# Patient Record
Sex: FEMALE | Race: WHITE
[De-identification: names, ages, dates, MRNs, and addresses within clinical notes are randomized per-mention and may not be internally consistent; named-entity substitution may affect disease eponyms.]

---

## 2019-02-24 ENCOUNTER — HOSPITAL ENCOUNTER (EMERGENCY)
Dept: HOSPITAL 74 - JER | Age: 44
Discharge: ANOTHER HOSPITAL | End: 2019-02-24
Payer: COMMERCIAL

## 2019-02-24 VITALS — SYSTOLIC BLOOD PRESSURE: 170 MMHG | DIASTOLIC BLOOD PRESSURE: 106 MMHG | HEART RATE: 126 BPM

## 2019-02-24 DIAGNOSIS — I10: ICD-10-CM

## 2019-02-24 DIAGNOSIS — F17.210: ICD-10-CM

## 2019-02-24 DIAGNOSIS — E78.5: ICD-10-CM

## 2019-02-24 DIAGNOSIS — Z91.89: ICD-10-CM

## 2019-02-24 DIAGNOSIS — Z82.49: ICD-10-CM

## 2019-02-24 DIAGNOSIS — I21.3: Primary | ICD-10-CM

## 2019-02-24 DIAGNOSIS — R73.9: ICD-10-CM

## 2019-02-24 LAB
ALBUMIN SERPL-MCNC: 4.2 G/DL (ref 3.4–5)
ALP SERPL-CCNC: 100 U/L (ref 45–117)
ALT SERPL-CCNC: 46 U/L (ref 13–61)
ANION GAP SERPL CALC-SCNC: 11 MMOL/L (ref 8–16)
AST SERPL-CCNC: 237 U/L (ref 15–37)
BASOPHILS # BLD: 1 % (ref 0–2)
BILIRUB SERPL-MCNC: 0.7 MG/DL (ref 0.2–1)
BUN SERPL-MCNC: 15 MG/DL (ref 7–18)
CALCIUM SERPL-MCNC: 8.6 MG/DL (ref 8.5–10.1)
CHLORIDE SERPL-SCNC: 102 MMOL/L (ref 98–107)
CO2 SERPL-SCNC: 24 MMOL/L (ref 21–32)
CREAT SERPL-MCNC: 1 MG/DL (ref 0.55–1.3)
DEPRECATED RDW RBC AUTO: 14.1 % (ref 11.6–15.6)
EOSINOPHIL # BLD: 0.8 % (ref 0–4.5)
GLUCOSE SERPL-MCNC: 109 MG/DL (ref 74–106)
HCT VFR BLD CALC: 46.7 % (ref 32.4–45.2)
HGB BLD-MCNC: 16 GM/DL (ref 10.7–15.3)
INR BLD: 1.15 (ref 0.83–1.09)
LYMPHOCYTES # BLD: 22.9 % (ref 8–40)
MAGNESIUM SERPL-MCNC: 2.1 MG/DL (ref 1.8–2.4)
MCH RBC QN AUTO: 29.2 PG (ref 25.7–33.7)
MCHC RBC AUTO-ENTMCNC: 34.3 G/DL (ref 32–36)
MCV RBC: 84.9 FL (ref 80–96)
MONOCYTES # BLD AUTO: 8.3 % (ref 3.8–10.2)
NEUTROPHILS # BLD: 67 % (ref 42.8–82.8)
PLATELET # BLD AUTO: 199 K/MM3 (ref 134–434)
PMV BLD: 9.8 FL (ref 7.5–11.1)
POTASSIUM SERPLBLD-SCNC: 3.6 MMOL/L (ref 3.5–5.1)
PROT SERPL-MCNC: 7.9 G/DL (ref 6.4–8.2)
PT PNL PPP: 13.6 SEC (ref 9.7–13)
RBC # BLD AUTO: 5.5 M/MM3 (ref 3.6–5.2)
SODIUM SERPL-SCNC: 137 MMOL/L (ref 136–145)
WBC # BLD AUTO: 19.8 K/MM3 (ref 4–10)

## 2019-02-24 PROCEDURE — 3E033GC INTRODUCTION OF OTHER THERAPEUTIC SUBSTANCE INTO PERIPHERAL VEIN, PERCUTANEOUS APPROACH: ICD-10-PCS

## 2019-02-24 NOTE — EKG
Test Reason : 

Blood Pressure : ***/*** mmHG

Vent. Rate : 136 BPM     Atrial Rate : 136 BPM

   P-R Int : 136 ms          QRS Dur : 078 ms

    QT Int : 292 ms       P-R-T Axes : 058 168 060 degrees

   QTc Int : 439 ms

 

SINUS TACHYCARDIA

POSSIBLE LEFT ATRIAL ENLARGEMENT

INFERIOR INFARCT , POSSIBLY ACUTE WITH ST ELEVATION

ANTERIORLATERAL ST ELEVATION, MYOCARDIAL INFARCTION, ACUTE OR RECENT

POOR R PROGRESSION DUE TO MYOCARDIAL DAMAGE

ABNORMAL ECG

NO PREVIOUS ECGS AVAILABLE

Confirmed by DENISE CRUZ, PATSY ([id number]) on 2/24/2019 10:02:48 PM

 

Referred By:             Confirmed By:PATSY WALKER MD

## 2019-02-24 NOTE — PDOC
Attending Attestation





- HPI


HPI: 





02/24/19 15:45


The patient is a 43 year old female with a PMH of obesity and HTN who presents 

to the ER with persistent left sided arm discomfort and difficulty sleeping 

secondary to the discomfort for the past 3 days. Patient was seen by an urgent 

care at [facility] and was sent to the ED for further evaluation of abnormal 

EKG. Patient admits to an extensive smoking history. Patient has a positive 

cardiac family history. 


 


The patient denies chest pain, shortness of breath, headache and dizziness.


Denies fever, chills, nausea, vomit, diarrhea and constipation.


Denies dysuria, frequency, urgency and hematuria.


 


Allergies: NKA


Past surgical history: None reported. 


Social history: No reported drug or alcohol use. Every day smoker. 


 





<Milady Escobar - Last Filed: 02/24/19 15:47>





- Resident


Resident Name: Antoni Mobley





- ED Attending Attestation


I have performed the following: I have examined & evaluated the patient, The 

case was reviewed & discussed with the resident, I agree w/resident's findings 

& plan, Exceptions are as noted





- Physicial Exam


PE: 





02/24/19 15:54





GENERAL: Awake, alert, and fully oriented, in no acute distress


HEAD: No signs of trauma


EYES: PERRLA, EOMI, sclera anicteric, conjunctiva clear


ENT: Auricles normal inspection, hearing grossly normal, nares patent, Moist 

mucosa


NECK: Normal ROM, supple,


LUNGS: Breath sounds equal, clear to auscultation bilaterally.  No wheezes, and 

no crackles


HEART: Tachycardia, Regular rate and rhythm, normal S1 and S2, no murmurs, rubs 

or gallops


ABDOMEN: Soft, nontender, No guarding, no rebound.  No masses


EXTREMITIES: Normal range of motion, no edema.  No clubbing or cyanosis. No 

cords, erythema, or tenderness


NEUROLOGICAL: Cranial nerves II through XII grossly intact.  Normal speech


SKIN: Warm, Dry, normal turgor, no rashes or lesions noted.





- Critical Care Time


Total Critical Care Time: 30


Critical Care Statement: The care of this patient involved high complexity 

decision making to prevent further life threatening deterioration of the patient

's condition and/or to evaluate & treat vital organ system(s) failure or risk 

of failure.





- Medical Decision Making





02/24/19 15:34





A portion of this note was documented by scribe services under my direction. I 

have reviewed the details of the note, within reason, and agree with the 

documentation with the following case summary and management plan written by 

me. 





Patient treated in the ED.





Nursing notes are reviewed and incorporated into the medical decision-making.


Vital signs reviewed.





Peripheral IV access obtained by the nurse, laboratory studies are drawn and 

sent, reviewed and interpreted by myself. 





Vital Signs











Temp Pulse Resp BP Pulse Ox


 


    142 H  20   210/110 H  100 


 


    02/24/19 15:24  02/24/19 15:24  02/24/19 15:24  02/24/19 15:24








43 year old female with reportedly no past medical history, extensive smoking 

history presents with left arm discomfort.


Pt reports for the last 3 days, she felt discomfort left arm pain, unable to 

sleep.


No exacerbating or improving risk factors.


Not exertional, however, nonreproducible.


Pt does report signficantly strong family history of cardiac disease and MIs in 

early ages with her father.





Pt went to urgent care at [facility], which was concerning for MI.


Pt was sent to the ER here.





Here ECG, demonstrates findings concerning for STEMI.


Stat Cards consultation.


Labs including troponin.


Chest xray


Aspirin.


Pt's story is somewhat atypical but ECG is very concerning


Very low threshold to transfer stat for cardiac catherization.


02/24/19 15:53





Case discussed with DR. Stoll.


Agrees that this is STEMI.


Pt requests [facility] Transfer.


Will reach [facility] [place].


Pt agrees for transfer.





02/24/19 16:01





 CBC, BMP





 02/24/19 15:30 





 02/24/19 15:30 





 CMP











Sodium  137 mmol/L (136-145)   02/24/19  15:30    


 


Potassium  3.6 mmol/L (3.5-5.1)   02/24/19  15:30    


 


Chloride  102 mmol/L ()   02/24/19  15:30    


 


Carbon Dioxide  24 mmol/L (21-32)   02/24/19  15:30    


 


Anion Gap  11 MMOL/L (8-16)   02/24/19  15:30    


 


BUN  15 mg/dL (7-18)   02/24/19  15:30    


 


Creatinine  1.0 mg/dL (0.55-1.3)   02/24/19  15:30    


 


Creat Clearance w eGFR  > 60  (>60)   02/24/19  15:30    


 


Random Glucose  109 mg/dL ()  H  02/24/19  15:30    


 


Calcium  8.6 mg/dL (8.5-10.1)   02/24/19  15:30    


 


Magnesium  2.1 mg/dL (1.8-2.4)   02/24/19  15:30    


 


Total Bilirubin  0.7 mg/dL (0.2-1)   02/24/19  15:30    


 


AST  237 U/L (15-37)  H  02/24/19  15:30    


 


ALT  46 U/L (13-61)   02/24/19  15:30    


 


Alkaline Phosphatase  100 U/L ()   02/24/19  15:30    


 


Creatine Kinase  > 1000 U/L ()  H  02/24/19  15:30    


 


Total Protein  7.9 g/dl (6.4-8.2)   02/24/19  15:30    


 


Albumin  4.2 g/dl (3.4-5.0)   02/24/19  15:30    








Trop 28


Pt case discussed with DR. Jerez - [facility].


Requests ticagrelor.


Will transfer stat to [place].





<Elgin Berry - Last Filed: 02/24/19 16:06>





**Heart Score/ECG Review


  ** #1


ECG reviewed & interpreted by me at: 15:25





02/24/19 15:34


, Q-GLENROY V3-V6, GLENROY II, avF,  msec (STEMI)





<Elgin Berry - Last Filed: 02/24/19 16:06>

## 2019-02-24 NOTE — PDOC
History of Present Illness





- General


Chief Complaint: Pain


Stated Complaint: RIGHT ARM PAIN


Time Seen by Provider: 02/24/19 15:24


History Source: Patient


Exam Limitations: No Limitations





- History of Present Illness


Initial Comments: 





02/24/19 15:42


Patient is a 43F with history of obesity, htn, smoking here today complaining 

of left arm pain. She initially presented to [facility] urgent care with left arm 

pain. EKG showed STEMI, patient drove to our hospital denying an ambulance. 

Patient states that the pain has been going on for three days. No modifying 

factors. Patient states that the pain is bad enough to keep her up at night. 

EKG done at [facility] shows STEMI in lateral leads.





Past History





- Past Medical History


Allergies/Adverse Reactions: 


 Allergies











Allergy/AdvReac Type Severity Reaction Status Date / Time


 


No Known Allergies Allergy   Verified 02/24/19 15:33











Home Medications: 


Ambulatory Orders





NK [No Known Home Medication]  02/24/19 








COPD: No





- Suicide/Smoking/Psychosocial Hx


Smoking History: Current every day smoker


Number of Cigarettes Smoked Daily: 10


Information on smoking cessation initiated: No





**Review of Systems





- Review of Systems


Able to Perform ROS?: Yes


Comments:: 





02/24/19 15:49


GENERAL/CONSTITUTIONAL: No fever or chills. No weakness.


HEAD, EYES, EARS, NOSE AND THROAT: No change in vision. No sore throat.


CARDIOVASCULAR: No chest pain or shortness of breath


RESPIRATORY: No cough, wheezing, or hemoptysis.


GASTROINTESTINAL: No nausea, vomiting, diarrhea or constipation.


GENITOURINARY: No dysuria, frequency, or change in urination.


MUSCULOSKELETAL: +L arm pain. No neck or back pain.


SKIN: No rash


NEUROLOGIC: No headache, vertigo, loss of consciousness, or change in strength/

sensation.


ENDOCRINE: No increased thirst. No abnormal weight change


HEMATOLOGIC/LYMPHATIC: No anemia, easy bleeding, or history of blood clots.


ALLERGIC/IMMUNOLOGIC: No hives or skin allergy.








*Physical Exam





- Vital Signs


 Last Vital Signs











Temp Pulse Resp BP Pulse Ox


 


    142 H  20   210/110 H  100 


 


    02/24/19 15:24  02/24/19 15:24  02/24/19 15:24  02/24/19 15:24














- Physical Exam


Comments: 





02/24/19 15:50


GENERAL: Awake, alert, and fully oriented, in no acute distress


HEAD: No signs of trauma, normocephalic, atraumatic


EYES: PERRLA, EOMI, sclera anicteric, conjunctiva clear


ENT: Auricles normal inspection, hearing grossly normal, nares patent, 

oropharynx clear without


exudates. Moist mucosa


NECK: Normal ROM, supple, no lymphadenopathy, JVD, or masses


LUNGS: No distress, speaks full sentences, clear to auscultation bilaterally


HEART: Tachycardic, normal S1 and S2, no murmurs, rubs or gallops, peripheral 

pulses normal and equal bilaterally.


ABDOMEN: Soft, nontender, normoactive bowel sounds.  No guarding, no rebound.  

No masses


EXTREMITIES: Normal inspection, Normal range of motion, no edema.  No clubbing 

or cyanosis.


NEUROLOGICAL: Cranial nerves II through XII grossly intact.  Normal speech, 

normal gait, no focal sensorimotor deficits


SKIN: Warm, Dry, normal turgor, no rashes or lesions noted








Moderate Sedation





- Procedure Monitoring


Vital Signs: 


Procedure Monitoring Vital Signs











Temperature      


 


Pulse Rate  142 H  02/24/19 15:24


 


Respiratory Rate  20   02/24/19 15:24


 


Blood Pressure  210/110 H  02/24/19 15:24


 


O2 Sat by Pulse Oximetry (%)  100   02/24/19 15:24











ED Treatment Course





- LABORATORY


CBC & Chemistry Diagram: 


 02/24/19 15:30





 02/24/19 15:30





- RADIOLOGY


Radiology Studies Ordered: 














 Category Date Time Status


 


 CHEST X-RAY PORTABLE* [RAD] Stat Radiology  02/24/19 15:27 Ordered














- Medications


Given in the ED: 


ED Medications














Discontinued Medications














Generic Name Dose Route Start Last Admin





  Trade Name Freq  PRN Reason Stop Dose Admin


 


Aspirin  162 mg  02/24/19 15:27  02/24/19 15:33





  Asa -  PO  02/24/19 15:28  162 mg





  ONCE ONE   Administration





     





     





     





     














Medical Decision Making





- Medical Decision Making





02/24/19 15:50


Patient is 43F with history of obesity, htn, smoking here today with l arm 

pain. Vitals notable for htn. 





EKG from [facility] shows ST elevations in lateral leads, rate of 90. Normal axis, 

normal intervals.





EKG from triage shows st elevations in V3-V6, I, II, aVF. Rate 136, sinus 

tachycardia. Normal intervals.


02/24/19 16:08


Trop 28, cbc shows leukocytosis. No signs of alternative diagnosis such as PE, 

dissection, sepsis. 





Case d/w Dr Chirinos, accepted for transfer. EKGs sent. Brilinta 180 given as per 

request. Aspirin and heparin given.








02/24/19 16:13


CXR shows cardiomegaly, no widened mediastinum, no infiltrate.





Patient transferred to [facility].





*DC/Admit/Observation/Transfer


Diagnosis at time of Disposition: 


 STEMI (ST elevation myocardial infarction)








- Discharge Dispostion


Disposition: TRANSFER ACUTE CARE/OTHER HOSP


Condition at time of disposition: Critical





- Referrals





- Patient Instructions





- Post Discharge Activity





- Transfer to Acute Care Facility


Receiving Facility: [facility]


Accepting Physician:: Karen

## 2019-03-01 ENCOUNTER — HOSPITAL ENCOUNTER (EMERGENCY)
Dept: HOSPITAL 74 - JER | Age: 44
Discharge: ANOTHER HOSPITAL | End: 2019-03-01
Payer: COMMERCIAL

## 2019-03-01 VITALS — DIASTOLIC BLOOD PRESSURE: 86 MMHG | SYSTOLIC BLOOD PRESSURE: 136 MMHG | HEART RATE: 74 BPM

## 2019-03-01 VITALS — BODY MASS INDEX: 42.1 KG/M2

## 2019-03-01 VITALS — TEMPERATURE: 98 F

## 2019-03-01 DIAGNOSIS — Z87.891: ICD-10-CM

## 2019-03-01 DIAGNOSIS — Z95.5: ICD-10-CM

## 2019-03-01 DIAGNOSIS — I25.2: ICD-10-CM

## 2019-03-01 DIAGNOSIS — I21.3: Primary | ICD-10-CM

## 2019-03-01 LAB
ALBUMIN SERPL-MCNC: 3.8 G/DL (ref 3.4–5)
ALP SERPL-CCNC: 88 U/L (ref 45–117)
ALT SERPL-CCNC: 36 U/L (ref 13–61)
ANION GAP SERPL CALC-SCNC: 8 MMOL/L (ref 8–16)
AST SERPL-CCNC: 38 U/L (ref 15–37)
BASOPHILS # BLD: 1.1 % (ref 0–2)
BILIRUB SERPL-MCNC: 0.8 MG/DL (ref 0.2–1)
BUN SERPL-MCNC: 14 MG/DL (ref 7–18)
CALCIUM SERPL-MCNC: 9.1 MG/DL (ref 8.5–10.1)
CHLORIDE SERPL-SCNC: 104 MMOL/L (ref 98–107)
CO2 SERPL-SCNC: 26 MMOL/L (ref 21–32)
CREAT SERPL-MCNC: 0.9 MG/DL (ref 0.55–1.3)
DEPRECATED RDW RBC AUTO: 13.4 % (ref 11.6–15.6)
EOSINOPHIL # BLD: 1.8 % (ref 0–4.5)
GLUCOSE SERPL-MCNC: 118 MG/DL (ref 74–106)
HCT VFR BLD CALC: 41.1 % (ref 32.4–45.2)
HGB BLD-MCNC: 14 GM/DL (ref 10.7–15.3)
INR BLD: 1.25 (ref 0.83–1.09)
LYMPHOCYTES # BLD: 17 % (ref 8–40)
MAGNESIUM SERPL-MCNC: 2.7 MG/DL (ref 1.8–2.4)
MCH RBC QN AUTO: 29 PG (ref 25.7–33.7)
MCHC RBC AUTO-ENTMCNC: 33.9 G/DL (ref 32–36)
MCV RBC: 85.4 FL (ref 80–96)
MONOCYTES # BLD AUTO: 6.4 % (ref 3.8–10.2)
NEUTROPHILS # BLD: 73.7 % (ref 42.8–82.8)
PLATELET # BLD AUTO: 252 K/MM3 (ref 134–434)
PMV BLD: 9.7 FL (ref 7.5–11.1)
POTASSIUM SERPLBLD-SCNC: 4.2 MMOL/L (ref 3.5–5.1)
PROT SERPL-MCNC: 7.9 G/DL (ref 6.4–8.2)
PT PNL PPP: 14.8 SEC (ref 9.7–13)
RBC # BLD AUTO: 4.82 M/MM3 (ref 3.6–5.2)
SODIUM SERPL-SCNC: 138 MMOL/L (ref 136–145)
WBC # BLD AUTO: 12.2 K/MM3 (ref 4–10)

## 2019-03-01 NOTE — PDOC
History of Present Illness





- General


Chief Complaint: Pain, Acute


Stated Complaint: LT SHOULDER PAIN


Time Seen by Provider: 03/01/19 13:57





- History of Present Illness


Initial Comments: 


Connie Bello is a 42yo woman with a PMH of recent STEMI (seen 2/24/19) 

now s/p 2x stent placement at [facility], HTN, obesity, smoking (~

10 pack years, quit since recent MI) who presents with 2/10 left shoulder that 

started around 10-11am today. The current pain is described as an ache, and it 

is very similar to the pain she experienced with her STEMI. She denies any 

diaphoresis, chest pain, SOB, pain radiation, lightheadedness, or other recent 

symptoms. Ms Bello states that she has been taking all of the medications 

she was prescribed at her hospital discharge on Wednesday. Over the past few 

days, she has been pain-free up until this morning. 





Past History





- Past Medical History


Allergies/Adverse Reactions: 


 Allergies











Allergy/AdvReac Type Severity Reaction Status Date / Time


 


No Known Allergies Allergy   Verified 03/01/19 13:59











Home Medications: 


Ambulatory Orders





Aspirin [ASA -] 81 mg PO DAILY 03/01/19 


Atorvastatin Ca [Lipitor] 80 mg PO HS 03/01/19 


Lisinopril [Prinivil] 10 mg PO DAILY 03/01/19 


Metoprolol Succinate [Toprol Xl] 100 mg PO DAILY 03/01/19 


Nitroglycerin Sublingual [Nitrostat -] 0.4 mg SL ASDIR PRN 03/01/19 


Ticagrelor [Brilinta] 90 mg PO BID 03/01/19 








Cardiac Disorders: Yes (MI W/ STENTS X2)


COPD: No





- Surgical History


Cardiac Surgery: Yes (STENTS X2)





- Immunization History


Immunization Up to Date: Yes





- Suicide/Smoking/Psychosocial Hx


Smoking History: Never smoked


Number of Cigarettes Smoked Daily: 10


Information on smoking cessation initiated: No


Hx Alcohol Use: No


Drug/Substance Use Hx: No





Cardiac Specific PMH





- Complaint Specific PMHX


Abdominal Aortic Aneurysm: No


Angina: No


Cardiac Arrhythmia: No


Cardiac Stent: Yes (2/24/19, 2x stents inc LAD)


Myocardial Infarction: Yes (2/24/19)





**Review of Systems





- Review of Systems


Comments:: 


General: No fevers, no chills, no weight or appetite change, no malaise


HEENT: No changes in vision, no changes in hearing, no congestion, no sore 

throat


CV: No chest pain, no palpitations, no LE edema. +L shoulder pain, +STEMI on 2/ 24/19 s/p 2x stents


Pulm: No SOB, no cough, no wheezing


GI: No nausea or vomiting, no change in bowel habits, no melena


: No frequency, no urgency, no dysuria


Musc: No back pain, no joint swelling, no recent injury


Skin: No rash, no lesions, no erythema


Endo: No excessive thirst, no heat/cold intolerance


Heme: No unusual bruising or bleeding, no swollen glands


Neuro: No syncope, no numbness/tingling, no focal weakness


Vasc: No claudication


Psych: No recent change in mood, no SI or HI











*Physical Exam





- Vital Signs


 Last Vital Signs











Temp Pulse Resp BP Pulse Ox


 


 98.0 F   76   18   141/97   100 


 


 03/01/19 14:40  03/01/19 14:40  03/01/19 14:40  03/01/19 14:40  03/01/19 14:40














- Physical Exam


Comments: 


General: Comfortable, no acute distress


HEENT: PERRL, EOMI, MMM, voice normal, normal neck ROM, no LAD


Cards: RRR, no murmur appreciated


Pulm: Comfortable on room air, clear to auscultation bilaterally


Abd: Soft, nontender, nondistended


Ext: Atraumatic. No LE edema. ROM intact. Strength equal bilaterally


Vasc: Extremities WWP


Skin: Normal color, no rashes or lesions


Neuro: A&Ox3, CN grossly intact, normal speech, motor/sensory grossly intact 

and symmetric


Psych: Mood appropriate to situation











Moderate Sedation





- Procedure Monitoring


Vital Signs: 


Procedure Monitoring Vital Signs











Temperature  98.0 F   03/01/19 14:40


 


Pulse Rate  76   03/01/19 14:40


 


Respiratory Rate  18   03/01/19 14:40


 


Blood Pressure  141/97   03/01/19 14:40


 


O2 Sat by Pulse Oximetry (%)  100   03/01/19 14:40











ED Treatment Course





- LABORATORY


CBC & Chemistry Diagram: 


 03/01/19 14:20





 03/01/19 14:20





- ADDITIONAL ORDERS


Additional order review: 


 Laboratory  Results











  03/01/19 03/01/19 03/01/19





  14:20 14:20 14:20


 


WBC    12.2 H


 


RBC    4.82


 


Hgb    14.0


 


Hct    41.1


 


MCV    85.4


 


MCH    29.0


 


MCHC    33.9


 


RDW    13.4


 


Plt Count    252  D


 


MPV    9.7


 


Absolute Neuts (auto)    9.0 H


 


Neutrophils %    73.7


 


Lymphocytes %    17.0  D


 


Monocytes %    6.4


 


Eosinophils %    1.8  D


 


Basophils %    1.1


 


Nucleated RBC %    0


 


PT with INR   14.80 H 


 


INR   1.25 H 


 


Sodium  138  


 


Potassium  4.2  


 


Chloride  104  


 


Carbon Dioxide  26  


 


Anion Gap  8  


 


BUN  14  


 


Creatinine  0.9  


 


Creat Clearance w eGFR  > 60  


 


Random Glucose  118 H  


 


Calcium  9.1  


 


Magnesium  2.7 H  


 


Total Bilirubin  0.8  


 


AST  38 H  


 


ALT  36  


 


Alkaline Phosphatase  88  


 


Creatine Kinase  185  


 


Creatine Kinase Index  0.9  


 


CK-MB (CK-2)  1.8  


 


Troponin I  6.44 H*  


 


Total Protein  7.9  


 


Albumin  3.8  








 











  03/01/19





  14:20


 


RBC  4.82


 


MCV  85.4


 


MCHC  33.9


 


RDW  13.4


 


MPV  9.7


 


Neutrophils %  73.7


 


Lymphocytes %  17.0  D


 


Monocytes %  6.4


 


Eosinophils %  1.8  D


 


Basophils %  1.1














- RADIOLOGY


Radiology Studies Ordered: 














 Category Date Time Status


 


 CHEST PA & LAT [RAD] Stat Radiology  03/01/19 15:18 Ordered














- Medications


Given in the ED: 


ED Medications














Discontinued Medications














Generic Name Dose Route Start Last Admin





  Trade Name Freq  PRN Reason Stop Dose Admin


 


Aspirin  162 mg  03/01/19 13:58  03/01/19 14:21





  Asa -  PO  03/01/19 13:59  162 mg





  ONCE ONE   Administration





     





     





     





     














Medical Decision Making





- Medical Decision Making





03/01/19 14:33


Connie Bello is a 42yo woman with a PMH of recent STEMI (seen 2/24/19) 

now s/p 2x stent placement at [facility], HTN, obesity, smoking (~

10 pack years, quit since recent MI) who presents with 2/10 left shoulder ache 

similar to the pain she experienced with her STEMI. An EKG was completed in [facility]

, and it shows ST elevations in V2-V6 similar to last week. The elevations 

appear to be less severe than previously, but there is no post-PCI EKG 

available for comparison. Ms Bello states that her previous pain did 

resolve after stenting, and it restarted this morning around 10-11am. 


- CBC, chemistry, cardiac profile, CXR ordered in [facility]


- Call placed to cardiology, waiting for call back





03/01/19 15:07


- Trop and CK pending


- 2x calls placed to cardiology w/o response


- On phone with [facility]. Difficulty sending EKG, 

will fax





03/01/19 15:29


- Accepted for transfer by Dr Jerez at [facility]


- Will consent for transfer. Ambulance is on the way for transfer





Discussed with Dr Moshe Grissom


PGY1











*DC/Admit/Observation/Transfer


Diagnosis at time of Disposition: 


 STEMI (ST elevation myocardial infarction)








- Discharge Dispostion


Disposition: TRANSFER ACUTE CARE/OTHER HOSP


Condition at time of disposition: Stable





- Referrals





- Patient Instructions





- Post Discharge Activity





- Transfer to Acute Care Facility


Receiving Facility: [facility])


Accepting Physician:: Dr Licea

## 2019-03-01 NOTE — PDOC
Rapid Medical Evaluation


Time Seen by Provider: 03/01/19 13:57


Medical Evaluation: 


 Allergies











Allergy/AdvReac Type Severity Reaction Status Date / Time


 


No Known Allergies Allergy   Verified 02/24/19 15:33











03/01/19 13:57


I have performed a brief in-person evaluation of this patient.





The patient presents with a chief complaint of:L shoulder pain s/p cardiac 

stent hx of STEMI





Pertinent physical exam findings:NAD   





I have ordered the following:Cardiac work up 





The patient will proceed to the ED for further evaluation.


03/01/19 13:59








**Discharge Disposition





- Diagnosis


 Shoulder pain, left








- Referrals





- Patient Instructions





- Post Discharge Activity

## 2019-03-01 NOTE — PDOC
Attending Attestation





- ED Attending Attestation


I have performed the following: I have examined & evaluated the patient, The 

case was reviewed & discussed with the resident, I agree w/resident's findings 

& plan





- HPI


HPI: 





03/01/19 15:38


The patient is a 43 year old female, with a significant past medical history of 

STEMI (seen 2/24/19 now s/p cardiac stenting x3 at [facility]), 

HTN, obesity, who presents to the emergency department with, left shoulder pain 

ranked a 2/10. Patients symptoms are similar to her prior MI.





She denies recent fevers, chills, headache or dizziness. She denies recent 

nausea, vomit, diarrhea or constipation. She denies recent  dysuria, frequency, 

urgency or hematuria. 





Allergies: NKDA 


Past surgical history: None reported.


Social history: Smoker (~10 pack years, quit since recent MI)


Cardiologist: Dr. Stoll 








- Medical Decision Making








03/01/19 14:15pm


Call placed to Dr. Stoll's office, awaiting call back.





14:30pm


Second call placed to Dr. Stoll's office, awaiting call back.





14:40pm


Call placed to [facility] transfer [place] for STEMI transfer. Resident Dr. Mckenzie Grissom discussed case with cardio fellow, case was accepted. 





14:30pm


Third call placed to Dr. Stoll's office, case was discussed.





<Francisco Javier Horn - Last Filed: 03/01/19 15:38>





- Resident


Resident Name: Mckenzie Grissom





- Physicial Exam


PE: 





03/03/19 00:39


Gen: alert, NAD


CV: rrr no m/r/g


Pulm: CTA b/l


Abdomen: soft, NT, ND





- Critical Care Time


Total Critical Care Time: 30


Critical Care Statement: The care of this patient involved high complexity 

decision making to prevent further life threatening deterioration of the patient

's condition and/or to evaluate & treat vital organ system(s) failure or risk 

of failure.





- Medical Decision Making





03/01/19 14:58


Pt presents to the ED with L shoulder pain similar to pain with recent MI.  EKG 

shows ST elevations in anterior and lateral leads.  Patient presented to the ED 

with STEMI one week ago and had cath at [facility] with two stents placed.  on 

brillanta, which she states she has been taking.  Concern for in stent 

stenosis.  Attempt to page Ajay x 2 without success.  Will call [facility] 

directly to initiate transfer to [facility]





<Vale Mesa - Last Filed: 03/03/19 00:40>





Attestations





- Attestations





03/01/19 15:41





Documentation prepared by Francisco Javier Horn, acting as medical scribe for 

Vale Mesa MD.





<Francisco Javier Horn - Last Filed: 03/01/19 15:38>

## 2019-03-04 NOTE — EKG
Test Reason : 

Blood Pressure : ***/*** mmHG

Vent. Rate : 090 BPM     Atrial Rate : 090 BPM

   P-R Int : 172 ms          QRS Dur : 094 ms

    QT Int : 370 ms       P-R-T Axes : 052 150 060 degrees

   QTc Int : 452 ms

 

NORMAL SINUS RHYTHM

POSSIBLE LEFT ATRIAL ENLARGEMENT

INFERIOR INFARCT (CITED ON OR BEFORE 24-FEB-2019)

ANTEROLATERAL INFARCT (CITED ON OR BEFORE 24-FEB-2019)

ST ELEVATION, EVOLVING MYOCARDIAL INFARCTION

ABNORMAL ECG

WHEN COMPARED WITH ECG OF 24-FEB-2019 15:22,

VENT. RATE HAS DECREASED BY  46 BPM

 

Confirmed by PATSY WALKER MD ([id number]) on 3/4/2019 11:04:07 AM

 

Referred By:             Confirmed By:PATSY WALKER MD

## 2019-03-21 ENCOUNTER — HOSPITAL ENCOUNTER (OUTPATIENT)
Dept: HOSPITAL 74 - JER | Age: 44
Setting detail: OBSERVATION
LOS: 1 days | Discharge: HOME | End: 2019-03-22
Attending: INTERNAL MEDICINE | Admitting: INTERNAL MEDICINE
Payer: COMMERCIAL

## 2019-03-21 VITALS — BODY MASS INDEX: 38.7 KG/M2

## 2019-03-21 DIAGNOSIS — I11.0: ICD-10-CM

## 2019-03-21 DIAGNOSIS — E66.9: ICD-10-CM

## 2019-03-21 DIAGNOSIS — Z95.5: ICD-10-CM

## 2019-03-21 DIAGNOSIS — R07.89: Primary | ICD-10-CM

## 2019-03-21 DIAGNOSIS — Z91.89: ICD-10-CM

## 2019-03-21 DIAGNOSIS — Z87.891: ICD-10-CM

## 2019-03-21 DIAGNOSIS — I50.9: ICD-10-CM

## 2019-03-21 DIAGNOSIS — E78.5: ICD-10-CM

## 2019-03-21 DIAGNOSIS — G47.30: ICD-10-CM

## 2019-03-21 DIAGNOSIS — I21.3: ICD-10-CM

## 2019-03-21 DIAGNOSIS — F41.0: ICD-10-CM

## 2019-03-21 DIAGNOSIS — Z79.82: ICD-10-CM

## 2019-03-21 DIAGNOSIS — Z82.49: ICD-10-CM

## 2019-03-21 LAB
ALBUMIN SERPL-MCNC: 4.4 G/DL (ref 3.4–5)
ALP SERPL-CCNC: 99 U/L (ref 45–117)
ALT SERPL-CCNC: 37 U/L (ref 13–61)
ANION GAP SERPL CALC-SCNC: 7 MMOL/L (ref 8–16)
APTT BLD: 32.5 SECONDS (ref 25.2–36.5)
AST SERPL-CCNC: 21 U/L (ref 15–37)
BASOPHILS # BLD: 0.6 % (ref 0–2)
BILIRUB SERPL-MCNC: 0.7 MG/DL (ref 0.2–1)
BUN SERPL-MCNC: 16 MG/DL (ref 7–18)
CALCIUM SERPL-MCNC: 9.1 MG/DL (ref 8.5–10.1)
CHLORIDE SERPL-SCNC: 105 MMOL/L (ref 98–107)
CO2 SERPL-SCNC: 27 MMOL/L (ref 21–32)
CREAT SERPL-MCNC: 1.1 MG/DL (ref 0.55–1.3)
DEPRECATED RDW RBC AUTO: 13.7 % (ref 11.6–15.6)
EOSINOPHIL # BLD: 1.1 % (ref 0–4.5)
GLUCOSE SERPL-MCNC: 123 MG/DL (ref 74–106)
HCT VFR BLD CALC: 41.6 % (ref 32.4–45.2)
HGB BLD-MCNC: 14.3 GM/DL (ref 10.7–15.3)
INR BLD: 1.16 (ref 0.83–1.09)
LYMPHOCYTES # BLD: 19.2 % (ref 8–40)
MCH RBC QN AUTO: 29.3 PG (ref 25.7–33.7)
MCHC RBC AUTO-ENTMCNC: 34.3 G/DL (ref 32–36)
MCV RBC: 85.6 FL (ref 80–96)
MONOCYTES # BLD AUTO: 5.1 % (ref 3.8–10.2)
NEUTROPHILS # BLD: 74 % (ref 42.8–82.8)
PLATELET # BLD AUTO: 174 K/MM3 (ref 134–434)
PMV BLD: 10.7 FL (ref 7.5–11.1)
POTASSIUM SERPLBLD-SCNC: 4.2 MMOL/L (ref 3.5–5.1)
PROT SERPL-MCNC: 8 G/DL (ref 6.4–8.2)
PT PNL PPP: 13.7 SEC (ref 9.7–13)
RBC # BLD AUTO: 4.86 M/MM3 (ref 3.6–5.2)
SODIUM SERPL-SCNC: 139 MMOL/L (ref 136–145)
WBC # BLD AUTO: 10 K/MM3 (ref 4–10)

## 2019-03-21 PROCEDURE — G0378 HOSPITAL OBSERVATION PER HR: HCPCS

## 2019-03-21 RX ADMIN — TICAGRELOR SCH MG: 90 TABLET ORAL at 21:26

## 2019-03-21 RX ADMIN — ENOXAPARIN SODIUM SCH MG: 40 INJECTION SUBCUTANEOUS at 19:27

## 2019-03-21 NOTE — PN
Teaching Attending Note


Name of Resident: Yaneth Hauser





ATTENDING PHYSICIAN STATEMENT





I saw and evaluated the patient.


I reviewed the resident's note and discussed the case with the resident.


I agree with the resident's findings and plan as documented.








SUBJECTIVE:


CC: CP 


HPI:  44 y/o lady with h/o recent diagnosis of HTN, HLP, CAD s/p STEMI on 2/24, 

s/p PCI with stenting of LAD who presented this time with chest pain. 


chest pain started yesterday at rest, it was intermittent and lasted 1-2 min 

the whole day. NG helped pain at some point. pain did not radiate to arms or 

neck, and was not associated with SOb or sweating or light headedness. this am 

she woke up with same sx of intermittent pain that prompted her to come to ER. 


Of note, patient had chest pain on 3/1 when she presented to the ER and was 

transferred to [facility] where another cath was done and reportedly it did not 

show anything. 


last echo was in second admission to [facility].. last EKG was on 3/11/19 , of 

which she has a picture on her phone ( still with some ST elevation and 

prominent TWI in lateral leads). 


In ER, bed side echo showed no effusion reportedly. EKG showed TWI in lateral 

leads. 





OBJECTIVE:


NAD, eating dinner , oriented and cooperative 


HEENT: MMM, EOMI, round equal pupils, reactive to light. no facial droop, 

tongue at mid line. no LAP in neck 


CV: RRR, no MRG , no JVD 


Lungs: CTAB 


Abd: soft, NT, Nd , NL BS 


Ext: no edema, no erythema. 





ASSESSMENT AND PLAN:





44 y/o lady with h/o recent diagnosis of HTN, HLP, CAD s/p STEMI on 2/24, s/p 

PCI with stenting of LAD who presented this time with chest pain





1- Chest pain: EKG shows improvement of one from 3/11.  trop is slightly 

elevated but could be coming down form original. had cath 3/1 which did not 

show thrombosis in stents ( reportedly ) . 


patient was seen by Dr. 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 

DDx. currently patient is CP  free. 


- repeat trop  6 hours form original , if increase of > 20 % this will be 

considered concerning . 


- repeat EKG if CP recurs. 


- cont Bb and lipitor . HR and BP within goal 


- cont ASA and Brilinta. 


- Echo in am 


- cont spironolactone 


- check A1c





2- H/o HTN: cont toprol. 





3- HLP: lipitor. need repeat lipid panel in 4 weeks 





4- DVT Px : Lovenox

## 2019-03-21 NOTE — HP
CHIEF COMPLAINT: chest pain x 2 days





PCP:





HISTORY OF PRESENT ILLNESS:





42 y/o F with PMH CAD s/p PCI x 2 (4 weeks prior), with TAO in mLAD, prox LAD, 

former smoker, obese, +cardiac fam hx in father, w/HTN, who presents to the ED c

/o 2 day hx chest pain. As per pt, the pain began yesterday and was in her mid-

sternal region and was characterized as intermittent "tightness." Non-

positional. Pain lasted for few minutes before subsiding. Discomfort was 2/10, 

and w/o radiation. For this reason, pt took nitro 0.4 at 4-5pm, which 

alleviated her pain. She was able to sleep. This morning, after pt dropped her 

kids off at school, the same pain recurred and was a/w nausea without emesis. 

For this reason, she decided to come to the ED for further evaluation. At this 

time, denies diaphoresis, palpitations, current N/V, HA, fever, chills, SOB, 

chest pressure, or changes in urinary or bowel function.





Pt follows with Dr. Stoll. Is scheduled for a stress test. Has had 

previous ECHO done in his office, however is unsure of exact results. Her 

current sx today are not the same as her past MI in February. At that time, she 

had a vague pain in her L arm.





ER course was notable for:


(1) asa 324x1


(2)


(3)





Recent Travel: denies 





PAST MEDICAL HISTORY: as above 





PAST SURGICAL HISTORY: none besides PCI above





Social History: works as an . can be stressful


Smoking: used to smoke 1/2 ppd x 10 yrs. quit in February after MI


Alcohol: rarely 


Drugs: used to smoke marijuana





Family History: father - cardiac issues, CHF, afib, HTN





Allergies





No Known Allergies Allergy (Verified 03/21/19 11:41)





HOME MEDICATIONS:


 Home Medications











 Medication  Instructions  Recorded


 


Aspirin [ASA -] 81 mg PO DAILY 03/01/19


 


Atorvastatin Ca [Lipitor] 80 mg PO HS 03/01/19


 


Lisinopril [Prinivil] 10 mg PO DAILY 03/01/19


 


Metoprolol Succinate [Toprol Xl] 100 mg PO DAILY 03/01/19


 


Nitroglycerin Sublingual 0.4 mg SL ASDIR PRN 03/01/19





[Nitrostat -]  


 


Ticagrelor [Brilinta] 90 mg PO BID 03/01/19


 


Spironolactone 12.5 mg PO DAILY 03/21/19








REVIEW OF SYSTEMS


CONSTITUTIONAL: 


Absent:  fever, chills, diaphoresis, generalized weakness, malaise, loss of 

appetite, weight change


HEENT: 


Absent:  rhinorrhea, nasal congestion, throat pain, throat swelling, difficulty 

swallowing, mouth swelling, ear pain, eye pain, visual changes


CARDIOVASCULAR: +chest pain


Absent: chest pain, syncope, palpitations, irregular heart rate, lightheadedness

, peripheral edema


RESPIRATORY: 


Absent: cough, shortness of breath, dyspnea with exertion, orthopnea, wheezing, 

stridor, hemoptysis


GASTROINTESTINAL:


Absent: abdominal pain, abdominal distension, nausea, vomiting, diarrhea, 

constipation, melena, hematochezia


GENITOURINARY: 


Absent: dysuria, frequency, urgency, hesitancy, hematuria, flank pain, genital 

pain


MUSCULOSKELETAL: 


Absent: myalgia, arthralgia, joint swelling, back pain, neck pain


SKIN: 


Absent: rash, itching, pallor


HEMATOLOGIC/IMMUNOLOGIC: 


Absent: easy bleeding, easy bruising, lymphadenopathy, frequent infections


ENDOCRINE:


Absent: unexplained weight gain, unexplained weight loss, heat intolerance, 

cold intolerance


NEUROLOGIC: 


Absent: headache, focal weakness or paresthesias, dizziness, unsteady gait, 

seizure, mental status changes, bladder or bowel incontinence


PSYCHIATRIC: 


Absent: anxiety, depression, suicidal or homicidal ideation, hallucinations.








PHYSICAL EXAMINATION


 Vital Signs - 24 hr











  03/21/19 03/21/19





  11:38 12:14


 


Temperature 98.5 F 


 


Pulse Rate 77 


 


Pulse Rate [  63





Apical]  


 


Respiratory 19 18





Rate  


 


Blood Pressure 164/73 


 


Blood Pressure  134/65





[Right Arm]  


 


O2 Sat by Pulse 99 96





Oximetry (%)  








GENERAL: Awake, alert, and fully oriented, in no acute distress.


HEAD: Normal with no signs of trauma.


EYES: Pupils equal, round and reactive to light, extraocular movements intact, 

sclera anicteric, conjunctiva clear. No lid lag.


EARS, NOSE, THROAT: Ears normal, nares patent, oropharynx clear without 

exudates. Moist mucous membranes.


NECK: Normal range of motion, supple 


LUNGS: Breath sounds equal, clear to auscultation bilaterally. No wheezes, and 

no crackles. No accessory muscle use.


HEART: Regular rate and rhythm, normal S1 and S2 without murmur, rub or gallop.


ABDOMEN: Soft, obese, nontender, not distended, normoactive bowel sounds


LOWER EXTREMITIES: 2+ pt pulses, warm, well-perfused. No calf tenderness. No 

peripheral edema. 


NEUROLOGICAL:  Cranial nerves II-XII intact. motor strength 5/5 UE, LE. 

sensation intact.


PSYCHIATRIC: +anxious


SKIN: Warm, dry, normal turgor





 Laboratory Results - last 24 hr











  03/21/19 03/21/19 03/21/19





  12:23 12:23 15:45


 


Sodium   139 


 


Potassium   4.2 


 


Chloride   105 


 


BUN   16 


 


Creatinine   1.1 


 


Random Glucose   123 H 


 


Total Bilirubin   0.7 


 


AST   21 


 


ALT   37 


 


Troponin I   0.06 H  0.07 H


 


Urine HCG, Qual  Negative  








EKG: NSR, rate 70bpm, qtc 483ms. +TWI lateral leads 


CXR: without acute pathology 





ASSESSMENT/PLAN:





42 y/o F with PMH CAD s/p PCI x 2 (4 weeks prior), with TAO in mLAD, prox LAD, 

former smoker, obese, +cardiac fam hx in father, w/HTN, who presents to the ED c

/o 2 day hx chest pain. 





#Atypical angina


r/o ACS


-2/3 criteria- substernal, relieved by NG


-with elevated trop 0.06>0.07. c/t trend until peak


-f/u ECHO, a1c 


-repeat lipid profile in 1 month


-Mg>2, K>4


-tele monitoring


-cardio consult: Dr. Stoll





#CAD s/p PCI x 2


-c/w asa, brillinta


-as with TAO, will likely need p2y12- for at least 1 yr





#HTN-currently controlled


-c/w toprol, lisinopril





#F/E/N


no IVF needed at this time


continue to follow lytes


na controlled/cholesterol controlled/heart healthy diet





#PPX


DVT: lovenox





#Dispo


tele-obs 








Visit type





- Emergency Visit


Emergency Visit: Yes


ED Registration Date: 03/21/19


Care time: The patient presented to the Emergency Department on the above date 

and was hospitalized for further evaluation of their emergent condition.





- New Patient


This patient is new to me today: Yes


Date on this admission: 03/21/19





- Critical Care


Critical Care patient: No

## 2019-03-21 NOTE — PDOC
Attending Attestation





- Resident


Resident Name: Quinton White





- ED Attending Attestation


I have performed the following: I have examined & evaluated the patient, The 

case was reviewed & discussed with the resident, I agree w/resident's findings 

& plan, Exceptions are as noted





- HPI


HPI: 





03/21/19 12:29


44 yo F h/o htn, cad, recently had two stents 4 weeks ago ( sees dr morrell 

) here with intermittent chest pain since yesterday. no f/c no numbness or 

tingling. took nitro, and relieved her pain.  pt at that time originally had 

only left shoulder pain.  one week following her stents she had a repeat cath 

for recurrent pain.  pt stats last night she was feeling pain, took nitro and 

it resolved. then this am she noticed feeling tired, and nauseas, with pain. no 

diaphoresis. no sob. no mod factors. no leg swelling. no numbness or tingling. 

no f/c


03/21/19 13:10








- Physicial Exam


PE: 





03/21/19 12:31


awake alert lungs clear bilaterally heart rrr no mrg abd soft nt nd. ext wwp no 

edema. no calf tenderness. pulses symmetric.  alert oriented x 3 . 





- Medical Decision Making





03/21/19 12:31


differential cad, stent occlusion, non stented lesion , infection,  plan cxr 

ekg labs , trop will d/w dr morrell. asa. will liklely require observation. 





<Nalini Caldera - Last Filed: 03/21/19 13:10>





- Medical Decision Making


EXAM#: TYPE/EXAM: RESULT: [id number] RAD/CHEST X-RAY PORTABLE* Chest pain. 


Impression. No evidence of active pulmonary disease. 


Reported By: Cleveland Mauro MD 03/21/19 13:51 





Documentation prepared by ERIC Post, acting as medical scribe for 

Nalini Caldera MD.





03/21/19 14:00





<Janis Fernandez - Last Filed: 03/21/19 14:01>

## 2019-03-21 NOTE — HP
CHIEF COMPLAINT: chest pain





PCP: no pcp; cardio is Dr Stoll





HISTORY OF PRESENT ILLNESS:


42 y/o female with PMH of CAD s/p PCI one month ago presented to the ED with 

chest pain that started yesterday. patient states yesterday she had some 

midsternal chest pain that did not last long; no radiation was involved she 

described the pain more as a discomfort. she took a nitro at around 4-5 oclock 

went to lay down then woke up to move to her bed and fell asleep. this morning 

she woke up the pain was there again and she had some associated nausea, once 

again the pain only lasted a few minutes, she got nervous and came to the ER. 

This was different from her pain from when she had her MI back in february 

where she only presented with right arm discomfort where she was found to have 

a STEMI and was transferred to [facility] for 2 stents. since then she has been 

taking all of her medications religiously, stopped smoking and exercising more 

frequently. By the time she got the hospital she did not have anymore symptoms  





ER course was notable for:


(1)trop .06 no EKG changes from previous


(2)given  x1; echo was done bedside- no signs of tamponade


(3)dr stoll saw patient ; possibly dresslers?  





Recent Travel:


none


PAST MEDICAL HISTORY:


see above


PAST SURGICAL HISTORY:


PCI in february


Social History:


Smoking:former smoker; quit when she found out she had MI


Alcohol:social drinker


Drugs: former marijuana; quit when she found out about MI





Family History:fathers side extensive cardiac history


Allergies





No Known Allergies Allergy (Verified 03/21/19 11:41)


 








HOME MEDICATIONS:


 Home Medications











 Medication  Instructions  Recorded


 


Aspirin [ASA -] 81 mg PO DAILY 03/01/19


 


Atorvastatin Ca [Lipitor] 80 mg PO HS 03/01/19


 


Lisinopril [Prinivil] 10 mg PO DAILY 03/01/19


 


Metoprolol Succinate [Toprol Xl] 100 mg PO DAILY 03/01/19


 


Nitroglycerin Sublingual 0.4 mg SL ASDIR PRN 03/01/19





[Nitrostat -]  


 


Ticagrelor [Brilinta] 90 mg PO BID 03/01/19


 


Spironolactone 12.5 mg PO DAILY 03/21/19








REVIEW OF SYSTEMS


CONSTITUTIONAL: 


Absent:  fever, chills, diaphoresis, generalized weakness, malaise, loss of 

appetite, weight change


HEENT: 


Absent:  rhinorrhea, nasal congestion, throat pain, throat swelling, difficulty 

swallowing, mouth swelling, ear pain, eye pain, visual changes


CARDIOVASCULAR: 


Present: chest pain Absent: syncope, palpitations, irregular heart rate, 

lightheadedness, peripheral edema


RESPIRATORY: 


Absent: cough, shortness of breath, dyspnea with exertion, orthopnea, wheezing, 

stridor, hemoptysis


GASTROINTESTINAL:


Absent: abdominal pain, abdominal distension, nausea, vomiting, diarrhea, 

constipation, melena, hematochezia


GENITOURINARY: 


Absent: dysuria, frequency, urgency, hesitancy, hematuria, flank pain, genital 

pain


MUSCULOSKELETAL: 


Absent: myalgia, arthralgia, joint swelling, back pain, neck pain


SKIN: 


Absent: rash, itching, pallor


HEMATOLOGIC/IMMUNOLOGIC: 


Absent: easy bleeding, easy bruising, lymphadenopathy, frequent infections


ENDOCRINE:


Absent: unexplained weight gain, unexplained weight loss, heat intolerance, 

cold intolerance


NEUROLOGIC: 


Absent: headache, focal weakness or paresthesias, dizziness, unsteady gait, 

seizure, mental status changes, bladder or bowel incontinence


PSYCHIATRIC: 


Absent: anxiety, depression, suicidal or homicidal ideation, hallucinations.








PHYSICAL EXAMINATION


 Vital Signs - 24 hr











  03/21/19 03/21/19 03/21/19





  11:38 11:50 12:14


 


Temperature 98.5 F  


 


Pulse Rate 77  


 


Pulse Rate [   63





Apical]   


 


Respiratory 19  18





Rate   


 


Blood Pressure 164/73  


 


Blood Pressure   134/65





[Right Arm]   


 


O2 Sat by Pulse 99 98 96





Oximetry (%)   











GENERAL: Awake, alert, and fully oriented, in no acute distress.


EYES: PEERLA: EOMI no scleral icterus 


NECK: no JVD; no lymphadenopathy 


LUNGS: CTA B/L; no rales, rhonchi or wheezing .


HEART: Regular rate and rhythm, normal S1 and S2 without murmur, rub or gallop.


ABDOMEN: soft; nontender; nondistended +BS. .


EXTREMITES: warm; well-perfused; no clubbing/cyanosis or edema 


PSYCHIATRIC: Cooperative. Good eye contact. Appropriate mood and affect.


SKIN: Warm, dry, normal turgor, no rashes or lesions noted, normal capillary 

refill. 


 Laboratory Results - last 24 hr











  03/21/19 03/21/19 03/21/19





  12:23 12:23 12:23


 


WBC  10.0  


 


RBC  4.86  


 


Hgb  14.3  


 


Hct  41.6  


 


MCV  85.6  


 


MCH  29.3  


 


MCHC  34.3  


 


RDW  13.7  


 


Plt Count  174  D  


 


MPV  10.7  D  


 


Absolute Neuts (auto)  7.4  


 


Neutrophils %  74.0  


 


Lymphocytes %  19.2  


 


Monocytes %  5.1  


 


Eosinophils %  1.1  


 


Basophils %  0.6  


 


Nucleated RBC %  0  


 


PT with INR   13.70 H 


 


INR   1.16 H 


 


PTT (Actin FS)   32.5 


 


Sodium   


 


Potassium   


 


Chloride   


 


Carbon Dioxide   


 


Anion Gap   


 


BUN   


 


Creatinine   


 


Creat Clearance w eGFR   


 


Random Glucose   


 


Calcium   


 


Total Bilirubin   


 


AST   


 


ALT   


 


Alkaline Phosphatase   


 


Creatine Kinase   


 


Troponin I   


 


Total Protein   


 


Albumin   


 


Urine HCG, Qual    Negative














  03/21/19 03/21/19





  12:23 15:45


 


WBC  


 


RBC  


 


Hgb  


 


Hct  


 


MCV  


 


MCH  


 


MCHC  


 


RDW  


 


Plt Count  


 


MPV  


 


Absolute Neuts (auto)  


 


Neutrophils %  


 


Lymphocytes %  


 


Monocytes %  


 


Eosinophils %  


 


Basophils %  


 


Nucleated RBC %  


 


PT with INR  


 


INR  


 


PTT (Actin FS)  


 


Sodium  139 


 


Potassium  4.2 


 


Chloride  105 


 


Carbon Dioxide  27 


 


Anion Gap  7 L 


 


BUN  16 


 


Creatinine  1.1 


 


Creat Clearance w eGFR  54.47 


 


Random Glucose  123 H 


 


Calcium  9.1 


 


Total Bilirubin  0.7 


 


AST  21 


 


ALT  37 


 


Alkaline Phosphatase  99 


 


Creatine Kinase  97 


 


Troponin I  0.06 H  0.07 H


 


Total Protein  8.0 


 


Albumin  4.4 


 


Urine HCG, Qual  











ASSESSMENT/PLAN:


42 y/o female with PMH of CAD s/p 2 stents, HTN placed 4 weeks ago at [facility] 

presented to the ED with on/off chest pain that started yesterday which was 

relieved upon arrival to the hospital.





#Chest Pain


no EKG changes from previous (patient also had picture of EKG done at dr. marshall office on 3/11 - todays shows slight improvement)


rule out ACS


-trend troponins (only 3 hours were in between 1st and 2nd)- trop #2 at 7pm


-echo ordered


-dr. stoll saw patient in ED ? dresslers no need for heparin gtt or any 

other intervention right now


-c/w brillinta 90 BID, ASA 81 daily, Atorvastatin 80, Spironolactone 12.5, 

lisinopril 10, toprol 100 XL daily


-monitor electrolytes : K>4, Mg>2





#HTN


c/w lisinopril 10


c/w toprol 100 XL daily





F/E/N


not on fluids


monitor electrolytes


sodium-controlled diet 





Problem List





- Problem


(1) Chest pain


Code(s): R07.9 - CHEST PAIN, UNSPECIFIED   


Qualifiers: 


   Chest pain type: unspecified   Qualified Code(s): R07.9 - Chest pain, 

unspecified   





(2) HTN (hypertension)


Code(s): I10 - ESSENTIAL (PRIMARY) HYPERTENSION   





Visit type





- Emergency Visit


Emergency Visit: Yes


ED Registration Date: 03/21/19


Care time: The patient presented to the Emergency Department on the above date 

and was hospitalized for further evaluation of their emergent condition.





- New Patient


This patient is new to me today: Yes


Date on this admission: 03/21/19





- Critical Care


Critical Care patient: No

## 2019-03-21 NOTE — PDOC
History of Present Illness





- General


Chief Complaint: Chest Pain


Stated Complaint: CHEST PAIN


History Source: Patient


Exam Limitations: No Limitations





- History of Present Illness


Initial Comments: 





03/21/19 12:17


44 yo F with a hx of CAD s/p PCI 2x 4 weeks ago and HTN presents to the 

emergency department with chest pain mid sternum. Per the patient, she states 

the pain started suddenly at random without radiation that feels like tightness 

currently 2/10. Of note, the pain  She took yesterday 0.4 nitro at 4-5 pm with 

relief achieved of the pain. The pain lasts for minutes and terminates on its 

own. This morning, she awoke with nausea and chest pain. Denies the following: 

recent travels, headache, lightheadedness, nausea, vomiting, dysuria, hematuria

, SOB, visual changes, FND, trauma, hx of DVT/PE, abdominal pain, diarrhea, 

hematochezia, and leg pain/swelling 





Shx: None


Meds: Brillinta, aspirin


Allergies: NKDA


Social: Former smoker; quit 4 weeks ago. Denies substance abuse and alcohol 

abuse.


Cardiologist: Dr. Stoll








Past History





- Past Medical History


Allergies/Adverse Reactions: 


 Allergies











Allergy/AdvReac Type Severity Reaction Status Date / Time


 


No Known Allergies Allergy   Verified 03/21/19 11:41











Home Medications: 


Ambulatory Orders





Aspirin [ASA -] 81 mg PO DAILY 03/01/19 


Atorvastatin Ca [Lipitor] 80 mg PO HS 03/01/19 


Lisinopril [Prinivil] 10 mg PO DAILY 03/01/19 


Metoprolol Succinate [Toprol Xl] 100 mg PO DAILY 03/01/19 


Nitroglycerin Sublingual [Nitrostat -] 0.4 mg SL ASDIR PRN 03/01/19 


Ticagrelor [Brilinta -] 90 mg PO BID 03/01/19 


Spironolactone 12.5 mg PO DAILY 03/21/19 








Cardiac Disorders: Yes (MI W/ STENTS X2)


COPD: No





- Surgical History


Cardiac Surgery: Yes (STENTS X2)





- Immunization History


Immunization Up to Date: Yes





- Suicide/Smoking/Psychosocial Hx


Smoking History: Former smoker


Have you smoked in the past 12 months: No


Number of Cigarettes Smoked Daily: 10


If you are a former smoker, when did you quit?: 2/2019


Information on smoking cessation initiated: No


Hx Alcohol Use: No


Drug/Substance Use Hx: No





**Review of Systems





- Review of Systems


Able to Perform ROS?: Yes


Is the patient limited English proficient: No


Constitutional: No: Chills, Diaphoresis, Fever, Weakness


HEENTM: No: Eye Pain, Blurred Vision, Recent change in vision, Ear Pain, Nose 

Pain, Mouth Pain


Respiratory: No: Cough, Shortness of Breath, Hemoptysis


Cardiac (ROS): Yes: Chest Pain.  No: Lightheadedness, Palpitations, Syncope, 

Chest Tightness


ABD/GI: No: Constipated, Diarrhea, Nausea, Rectal Bleeding, Vomiting, Tarry 

Stools


: No: Burning, Dysuria, Incontinence, Pain, Urgency


Musculoskeletal: No: Back Pain, Joint Pain, Neck Pain


Integumentary: No: Bruising, Erythema, Rash


Neurological: No: Headache, Numbness, Tingling, Tremors, Weakness, Dizziness


Psychiatric: No: Change in Appetite


Endocrine: No: Unexplained Weight Gain





*Physical Exam





- Vital Signs


 Last Vital Signs











Temp Pulse Resp BP Pulse Ox


 


 98.5 F   77   19   164/73   99 


 


 03/21/19 11:38  03/21/19 11:38  03/21/19 11:38  03/21/19 11:38  03/21/19 11:38














- Physical Exam


General Appearance: Yes: Nourished, Appropriately Dressed, Obese.  No: Apparent 

Distress


HEENT: positive: EOMI, DEBORAH, Normal Voice, Symmetrical, Pharynx Normal, Hearing 

Grossly Normal.  negative: Pale Conjunctivae, Scleral Icterus (R), Scleral 

Icterus (L), Muffled/Hoarse voice, Pharyngeal Erythema, Tonsillar Exudate, 

Tonsillar Erythema, Excessive drooling


Neck: positive: Trachea midline, Supple.  negative: Tender, Lymphadenopathy (R)

, Lymphadenopathy (L), Tender lateral, Tender midline


Respiratory/Chest: positive: Lungs Clear, Normal Breath Sounds.  negative: 

Chest Tender, Respiratory Distress, Accessory Muscle Use


Cardiovascular: positive: Regular Rhythm, Regular Rate, S1, S2, Systolic Murmur


Gastrointestinal/Abdominal: positive: Normal Bowel Sounds, Flat, Soft.  negative

: Tender


Lymphatic: negative: Adenopathy


Musculoskeletal: positive: Normal Inspection.  negative: CVA Tenderness, 

Vertebral Tenderness


Extremity: positive: Normal Capillary Refill, Normal Inspection, Normal Range 

of Motion.  negative: Tender, Swelling, Calf Tenderness


Integumentary: positive: Normal Color, Dry, Warm.  negative: Petechiae, Rash, 

Swelling


Neurologic: positive: CNs II-XII NML intact, Fully Oriented, Alert, Normal Mood/

Affect, Normal Response, Motor Strength 5/5.  negative: EOM Palsy





Moderate Sedation





- Procedure Monitoring


Vital Signs: 


Procedure Monitoring Vital Signs











Temperature  98.5 F   03/21/19 11:38


 


Pulse Rate  77   03/21/19 11:38


 


Respiratory Rate  19   03/21/19 11:38


 


Blood Pressure  164/73   03/21/19 11:38


 


O2 Sat by Pulse Oximetry (%)  99   03/21/19 11:38











**Heart Score/ECG Review





- ECG Intrepretation


Comment:: 





ventricular rate is 70 bpm, SC is 172 ms, QTc is 483 ms, QRS is 98 ms. NSR with 

q waves in II,III, aVF seen in prior EKG and q waves in the V2-V6 seen in 

previous EKG. No ST elevations or depressions. Changed from previous EKG which 

shows ST elevations. 








ED Treatment Course





- LABORATORY


CBC & Chemistry Diagram: 


 03/22/19 06:17





 03/22/19 06:17





Medical Decision Making





- Medical Decision Making





03/21/19 12:56


44 yo F with a hx of CAD s/p PCI 2x 4 weeks ago and HTN presents to the 

emergency department with chest pain mid sternum.





Initial vitals:


 Initial Vital Signs











Temp Pulse Resp BP Pulse Ox


 


 98.5 F   77   19   164/73   99 


 


 03/21/19 11:38  03/21/19 11:38  03/21/19 11:38  03/21/19 11:38  03/21/19 11:38








Work up:


ddx: ACS (recent hx of PCI 2/2 MI) vs PNA vs possible coronary artery vs aortic 

dissection (unlikely given that she had normal pulses without deficits or 

asymmetry, normocardia HR, no neck pain and/or back pain, no trauma, without 

tearing quality, lasting less than a minute) vs pleuritis vs pericarditis





 Laboratory Tests











  03/21/19 03/21/19 03/21/19





  12:23 12:23 12:23


 


WBC  10.0  


 


RBC  4.86  


 


Hgb  14.3  


 


Hct  41.6  


 


MCV  85.6  


 


MCH  29.3  


 


MCHC  34.3  


 


RDW  13.7  


 


Plt Count  174  D  


 


MPV  10.7  D  


 


Absolute Neuts (auto)  7.4  


 


Neutrophils %  74.0  


 


Lymphocytes %  19.2  


 


Monocytes %  5.1  


 


Eosinophils %  1.1  


 


Basophils %  0.6  


 


Nucleated RBC %  0  


 


PT with INR   13.70 H 


 


INR   1.16 H 


 


PTT (Actin FS)   32.5 


 


Sodium   


 


Potassium   


 


Chloride   


 


Carbon Dioxide   


 


Anion Gap   


 


BUN   


 


Creatinine   


 


Creat Clearance w eGFR   


 


Random Glucose   


 


Calcium   


 


Total Bilirubin   


 


AST   


 


ALT   


 


Alkaline Phosphatase   


 


Creatine Kinase   


 


Troponin I   


 


Total Protein   


 


Albumin   


 


Urine HCG, Qual    Negative














  03/21/19 03/21/19





  12:23 15:45


 


WBC  


 


RBC  


 


Hgb  


 


Hct  


 


MCV  


 


MCH  


 


MCHC  


 


RDW  


 


Plt Count  


 


MPV  


 


Absolute Neuts (auto)  


 


Neutrophils %  


 


Lymphocytes %  


 


Monocytes %  


 


Eosinophils %  


 


Basophils %  


 


Nucleated RBC %  


 


PT with INR  


 


INR  


 


PTT (Actin FS)  


 


Sodium  139 


 


Potassium  4.2 


 


Chloride  105 


 


Carbon Dioxide  27 


 


Anion Gap  7 L 


 


BUN  16 


 


Creatinine  1.1 


 


Creat Clearance w eGFR  54.47 


 


Random Glucose  123 H 


 


Calcium  9.1 


 


Total Bilirubin  0.7 


 


AST  21 


 


ALT  37 


 


Alkaline Phosphatase  99 


 


Creatine Kinase  97 


 


Troponin I  0.06 H  0.07 H


 


Total Protein  8.0 


 


Albumin  4.4 


 


Urine HCG, Qual  








labs wnl except for a slight elevation in troponin 0.06. CXR within normal 

limits and EKG without ST elevations or depresisons seen. Patient was given 

aspirin 324 mg full dose (she had 81 mg earlier today; gave the remainder). 


03/21/19 14:02


Spoke to Dr. Stoll. He will assess from [facility] results of last cath and 

dispo from there. 


Per cardiology, ok to go home after second trop. 


Second trop showed elevation from 0.06 to 0.07. Dr. Stoll was consulted 

and was ok with tele obs admission for further cardiac work up. 





Dispo:


Admit








*DC/Admit/Observation/Transfer


Diagnosis at time of Disposition: 


Chest pain


Qualifiers:


 Chest pain type: unspecified Qualified Code(s): R07.9 - Chest pain, unspecified








- Referrals





- Patient Instructions





- Post Discharge Activity

## 2019-03-22 VITALS — SYSTOLIC BLOOD PRESSURE: 129 MMHG | HEART RATE: 58 BPM | DIASTOLIC BLOOD PRESSURE: 58 MMHG | TEMPERATURE: 98.9 F

## 2019-03-22 LAB
ALBUMIN SERPL-MCNC: 3.7 G/DL (ref 3.4–5)
ALP SERPL-CCNC: 84 U/L (ref 45–117)
ALT SERPL-CCNC: 36 U/L (ref 13–61)
ANION GAP SERPL CALC-SCNC: 7 MMOL/L (ref 8–16)
AST SERPL-CCNC: 19 U/L (ref 15–37)
BASOPHILS # BLD: 1.1 % (ref 0–2)
BILIRUB SERPL-MCNC: 0.5 MG/DL (ref 0.2–1)
BUN SERPL-MCNC: 28 MG/DL (ref 7–18)
CALCIUM SERPL-MCNC: 8.7 MG/DL (ref 8.5–10.1)
CHLORIDE SERPL-SCNC: 106 MMOL/L (ref 98–107)
CO2 SERPL-SCNC: 25 MMOL/L (ref 21–32)
CREAT SERPL-MCNC: 0.9 MG/DL (ref 0.55–1.3)
DEPRECATED RDW RBC AUTO: 13.9 % (ref 11.6–15.6)
EOSINOPHIL # BLD: 3.5 % (ref 0–4.5)
GLUCOSE SERPL-MCNC: 100 MG/DL (ref 74–106)
HCT VFR BLD CALC: 38.4 % (ref 32.4–45.2)
HGB BLD-MCNC: 13.4 GM/DL (ref 10.7–15.3)
LYMPHOCYTES # BLD: 33.8 % (ref 8–40)
MAGNESIUM SERPL-MCNC: 2.4 MG/DL (ref 1.8–2.4)
MCH RBC QN AUTO: 29.8 PG (ref 25.7–33.7)
MCHC RBC AUTO-ENTMCNC: 35 G/DL (ref 32–36)
MCV RBC: 85.2 FL (ref 80–96)
MONOCYTES # BLD AUTO: 7.6 % (ref 3.8–10.2)
NEUTROPHILS # BLD: 54 % (ref 42.8–82.8)
PHOSPHATE SERPL-MCNC: 4.2 MG/DL (ref 2.5–4.9)
PLATELET # BLD AUTO: 145 K/MM3 (ref 134–434)
PMV BLD: 10.3 FL (ref 7.5–11.1)
POTASSIUM SERPLBLD-SCNC: 4.1 MMOL/L (ref 3.5–5.1)
PROT SERPL-MCNC: 7 G/DL (ref 6.4–8.2)
RBC # BLD AUTO: 4.51 M/MM3 (ref 3.6–5.2)
SODIUM SERPL-SCNC: 138 MMOL/L (ref 136–145)
WBC # BLD AUTO: 9.4 K/MM3 (ref 4–10)

## 2019-03-22 RX ADMIN — ENOXAPARIN SODIUM SCH: 40 INJECTION SUBCUTANEOUS at 10:15

## 2019-03-22 RX ADMIN — TICAGRELOR SCH MG: 90 TABLET ORAL at 11:08

## 2019-03-22 NOTE — PN
Teaching Attending Note


Name of Resident: Yaneth Hauser





ATTENDING PHYSICIAN STATEMENT





I saw and evaluated the patient.


I reviewed the resident's note and discussed the case with the resident.


I agree with the resident's findings and plan as documented.








SUBJECTIVE:





no fever or chills. continued to have this intermittent pain last night 2/10, 

lasting for , 1min. no OSB 


OBJECTIVE:


NAD


CV: RRR, no MRG , no JVD 


Lungs: CTAB 


Ext: no edema, no erythema. 





ASSESSMENT AND PLAN:





44 y/o lady with h/o recent diagnosis of HTN, HLP, CAD s/p STEMI on 2/24, s/p 

PCI with stenting of LAD who presented this time with chest pain





1- Chest pain: No new ischemic changes. trop trended down. Echo with no 

effusion and NL EF with minimal valvular changes. 


- cont home meds


- scheduled for stress test next week as outpt 


- f/u with card 


- A1c nl 





2- H/o HTN: cont toprol. 





3- HLP: lipitor. need repeat lipid panel in 4 weeks 





DC home

## 2019-03-22 NOTE — DS
Physical Exam: 


SUBJECTIVE: Patient seen and examined








OBJECTIVE:





 Vital Signs











 Period  Temp  Pulse  Resp  BP Sys/Lafleur  Pulse Ox


 


 Last 24 Hr  97.9 F-98.1 F  54-66  17-18  110-137/52-84  








PHYSICAL EXAM





GENERAL: The patient is awake, alert, and fully oriented, in no acute distress.


HEAD: Normal with no signs of trauma.


EYES: PERRL, extraocular movements intact, sclera anicteric, conjunctiva clear. 


ENT: Ears normal, nares patent, oropharynx clear without exudates, moist mucous 

membranes.


NECK: Trachea midline, full range of motion, supple. 


LUNGS: Breath sounds equal, clear to auscultation bilaterally, no wheezes, no 

crackles, no accessory muscle use. 


HEART: Regular rate and rhythm, S1, S2 without murmur, rub or gallop.


ABDOMEN: Soft, nontender, nondistended, normoactive bowel sounds, no guarding, 

no rebound, no hepatosplenomegaly, no masses.


EXTREMITIES: 2+ pulses, warm, well-perfused, no edema. 


NEUROLOGICAL: Cranial nerves II through XII grossly intact. Normal speech, gait 

not observed.


PSYCH: Normal mood, normal affect.


SKIN: Warm, dry, normal turgor, no rashes or lesions noted.





LABS


 Laboratory Results - last 24 hr











  03/21/19 03/21/19 03/22/19





  15:45 21:00 01:07


 


WBC   


 


RBC   


 


Hgb   


 


Hct   


 


MCV   


 


MCH   


 


MCHC   


 


RDW   


 


Plt Count   


 


MPV   


 


Absolute Neuts (auto)   


 


Neutrophils %   


 


Lymphocytes %   


 


Monocytes %   


 


Eosinophils %   


 


Basophils %   


 


Nucleated RBC %   


 


Sodium   


 


Potassium   


 


Chloride   


 


Carbon Dioxide   


 


Anion Gap   


 


BUN   


 


Creatinine   


 


Creat Clearance w eGFR   


 


Random Glucose   


 


Hemoglobin A1c %   


 


Calcium   


 


Phosphorus   


 


Magnesium   


 


Total Bilirubin   


 


AST   


 


ALT   


 


Alkaline Phosphatase   


 


Troponin I  0.07 H  0.07 H  0.06 H


 


Total Protein   


 


Albumin   














  03/22/19 03/22/19 03/22/19





  06:17 06:17 06:17


 


WBC  9.4  


 


RBC  4.51  


 


Hgb  13.4  


 


Hct  38.4  


 


MCV  85.2  


 


MCH  29.8  


 


MCHC  35.0  


 


RDW  13.9  


 


Plt Count  145  


 


MPV  10.3  


 


Absolute Neuts (auto)  5.1  


 


Neutrophils %  54.0  D  


 


Lymphocytes %  33.8  D  


 


Monocytes %  7.6  


 


Eosinophils %  3.5  D  


 


Basophils %  1.1  


 


Nucleated RBC %  0  


 


Sodium   138 


 


Potassium   4.1 


 


Chloride   106 


 


Carbon Dioxide   25 


 


Anion Gap   7 L 


 


BUN   28 H 


 


Creatinine   0.9 


 


Creat Clearance w eGFR   68.66 


 


Random Glucose   100 


 


Hemoglobin A1c %    5.5


 


Calcium   8.7 


 


Phosphorus   4.2 


 


Magnesium   2.4 


 


Total Bilirubin   0.5 


 


AST   19 


 


ALT   36 


 


Alkaline Phosphatase   84 


 


Troponin I   


 


Total Protein   7.0 


 


Albumin   3.7 











HOSPITAL COURSE:





Date of Admission:03/21/19


42 y/o female with PMH of CAD s/p 2 stents 4 weeks ago at [place] presented to 

the ED with on/off chest paiun that started two days ago which was relieved 

upon arrival to the hospital. Patient states that the pain was midsternal on/

off relieved by nitro and then fell aslpeep. she woke up next morning still 

having the discomfort with some associated nausea she got nervous and came to 

the hospital. EKG was done with no changes, trops were trended bumped up to 

0.07 but downtrended she got an echo which was normal- she was seen by her 

cardio dr stoll and she was stable to be dc home- with an appt to see him 

next week and hes in the process of setting up cardiac rehab


Date of Discharge: 03/22/19











Minutes to complete discharge: 39





Discharge Summary


Reason For Visit: CHEST PAIN


Condition: Stable





- Instructions


Diet, Activity, Other Instructions: 


You came to the emergency room with complaints of intermittent chest pain. We 

did an EKG , an echo and monitored your cardiac enzymes. You were seen by your 

cardiologist, Dr. Stoll and deemed you stable to be discharged home.





Please resume all of your home medications





We are referring you to an primary care physician with whom we would like you 

to follow up with in a week


Please see Dr. Stoll within one week





Please have your lipid panel repeated in 4 weeks








*if you begin to experience any worsening chest pains, trouble breathing, nausea

/vomiting please return to the emergency room immediately


Referrals: 


Nomi Smith MD [Staff Physician] - 1 Week


Zay Stoll MD [Staff Physician] - 1 Week


Disposition: HOME





- Home Medications


Comprehensive Discharge Medication List: 


Ambulatory Orders





Aspirin [ASA -] 81 mg PO DAILY 03/01/19 


Atorvastatin Ca [Lipitor] 80 mg PO HS 03/01/19 


Lisinopril [Prinivil] 10 mg PO DAILY 03/01/19 


Metoprolol Succinate [Toprol Xl] 100 mg PO DAILY 03/01/19 


Nitroglycerin Sublingual [Nitrostat -] 0.4 mg SL ASDIR PRN 03/01/19 


Ticagrelor [Brilinta -] 90 mg PO BID 03/01/19 


Spironolactone 12.5 mg PO DAILY 03/21/19 











Problem List





- Problems


(1) Chest pain


Code(s): R07.9 - CHEST PAIN, UNSPECIFIED   


Qualifiers: 


   Chest pain type: unspecified   Qualified Code(s): R07.9 - Chest pain, 

unspecified   





(2) HTN (hypertension)


Code(s): I10 - ESSENTIAL (PRIMARY) HYPERTENSION   


This patient is new to me today: No


Emergency Visit: Yes


ED Registration Date: 03/21/19


Care time: The patient presented to the Emergency Department on the above date 

and was hospitalized for further evaluation of their emergent condition.


Critical Care patient: No





- Discharge Referral


Referred to [facility] No

## 2019-03-22 NOTE — PN
Progress Note, Physician


Chief Complaint: 





Pt A&OX3; no symptoms. OOB in chair.


History of Present Illness: 





42 yo white woman with zPMHx STEMI--> coronary angiogram showing 1 VD-->stents 

of proxiaml and [facility] earlier this month with repeat 

coronary angiogram done a week later when pt returned with chest pain (no 

further PCI required at that time), ,cad,  here with intermittent chest pain 

since yesterday. no f/c no numbness or tingling. took nitro, and relieved her 

pain.  pt at that time originally had only left shoulder pain.  one week 

following her stents she had a repeat cath for recurrent pain.  pt stats last 

night she was feeling pain, took nitro and it resolved. then this am she 

noticed feeling tired, and nauseas, with pain. no diaphoresis. no sob. no mod 

factors. no leg swelling. no numbness or tingling. no f/c


03/21/19 13:10





- Current Medication List


Current Medications: 


Active Medications





Aspirin (Asa -)  81 mg PO DAILY [facility]


   Last Admin: 03/22/19 10:08 Dose:  81 mg


Atorvastatin Calcium (Lipitor -)  80 mg PO HS [facility]


   Last Admin: 03/21/19 21:26 Dose:  80 mg


Enoxaparin Sodium (Lovenox -)  40 mg SQ DAILY [facility]


   Last Admin: 03/22/19 10:15 Dose:  Not Given


Lisinopril (Prinivil)  10 mg PO DAILY [facility]


   Last Admin: 03/22/19 10:15 Dose:  10 mg


Metoprolol Succinate (Toprol Xl -)  100 mg PO DAILY [facility]


   Last Admin: 03/22/19 10:15 Dose:  100 mg


Spironolactone (Aldactone -)  12.5 mg PO DAILY [facility]


   Last Admin: 03/22/19 10:07 Dose:  12.5 mg


Ticagrelor (Brilinta -)  90 mg PO BID [facility]


   Last Admin: 03/22/19 11:08 Dose:  90 mg











- Objective


Vital Signs: 


 Vital Signs











Temperature  98.9 F   03/22/19 16:11


 


Pulse Rate  58 L  03/22/19 16:11


 


Respiratory Rate  20   03/22/19 16:11


 


Blood Pressure  129/58 L  03/22/19 16:11


 


O2 Sat by Pulse Oximetry (%)  100   03/22/19 09:00











Constitutional: Yes: No Distress, Other (alopecia)


Eyes: Yes: WNL


HENT: Yes: WNL


Neck: Yes: WNL


Cardiovascular: Yes: WNL


Respiratory: Yes: WNL


Gastrointestinal: Yes: Soft, Abdomen, Obese


...Rectal Exam: Yes: Deferred


Genitourinary: No: Anuria


Breast(s): Yes: WNL


Musculoskeletal: Yes: WNL


Extremities: Yes: WNL


Edema: No


Peripheral Pulses WNL: Yes


Integumentary: Yes: WNL


Neurological: Yes: WNL


Psychiatric: Yes: WNL


Labs: 


 CBC, BMP





 03/22/19 06:17 





 03/22/19 06:17 





 INR, PTT











INR  1.16  (0.83-1.09)  H  03/21/19  12:23    








 Abnormal Lab Results











  03/21/19 03/22/19 03/22/19





  21:00 01:07 06:17


 


Anion Gap    7 L


 


BUN    28 H


 


Troponin I  0.07 H  0.06 H 














- ....Imaging


EKG: Image Reviewed


Other: Image Reviewed (telemetry: NSR; no arrhythmias)





Problem List





- Problems


(1) Severe anxiety with panic


Code(s): F41.0 - PANIC DISORDER [EPISODIC PAROXYSMAL ANXIETY]   





(2) Family history of coronary artery disease


Code(s): Z82.49 - FAMILY HX OF ISCHEM HEART DIS AND OTH DIS OF THE [facility]   





(3) HTN (hypertension)


Code(s): I10 - ESSENTIAL (PRIMARY) HYPERTENSION   





(4) Hyperglycemia


Code(s): R73.9 - HYPERGLYCEMIA, UNSPECIFIED   





(5) Hyperlipidemia


Code(s): E78.5 - HYPERLIPIDEMIA, UNSPECIFIED   





(6) Obese


Code(s): E66.9 - OBESITY, UNSPECIFIED   





(7) STEMI (ST elevation myocardial infarction)


Assessment/Plan: 


TNI 0.06-->0.07--0.06


No acute STT changes on EKG.


Will discharge home, and f/u as outpt, with cardiac rehabilitation planned at 

[facility].


Code(s): I21.3 - ST ELEVATION (STEMI) MYOCARDIAL INFARCTION OF [facility] SITE   





(8) Sedentary lifestyle


Code(s): Z91.89 - OTH PERSONAL RISK FACTORS, NOT ELSEWHERE CLASSIFIED   





(9) CHF (congestive heart failure)


Assessment/Plan: 


Pt had mod-severe anteroapical and inferoapical systolic dysfunction consistent 

with wraparound LAD, with mild anterior basal hypokinesis.


Pt has been on metoprolol, lisinopril, and spironolactone since.


F/u ECHO for LVEF, regional wall motion evaluation.





Addendum: today's ECHO report notes normal LVEF; no regional wall motion 

abnormalities mentioned.


Continue above medications pres


Code(s): I50.9 - HEART FAILURE, UNSPECIFIED   





(10) Atypical chest pain


Code(s): R07.89 - OTHER CHEST PAIN   





(11) Sleep apnea


Code(s): G47.30 - SLEEP APNEA, UNSPECIFIED

## 2019-03-22 NOTE — ECHO
______________________________________________________________________________



Name: JOSH MANE                               Exam:Adult Echocardiogram

MRN: [id number]          Study Date: 2019 10:22 AM

Age: 43 yrs

______________________________________________________________________________



Reason For Study: CHEST PAIN

Height: 63 in        Weight: 235 lb        BSA: 2.1 m2



______________________________________________________________________________



MMode/2D Measurements & Calculations

IVSd: 0.82 cm                                          Ao root diam: 2.6 cm

LVIDd: 5.1 cm                                          LA dimension: 3.7 cm

LVIDs: 3.2 cm

LVPWd: 0.79 cm



________________________________________________________

EDV(Teich): 124.4 ml                                   LVOT diam: 2.3 cm

ESV(Teich): 41.3 ml



Doppler Measurements & Calculations

MV E max river: 74.5 cm/sec                                  Ao V2 max: 177.0 cm/sec

MV A max river: 53.3 cm/sec                                  Ao max P.5 mmHg

MV E/A: 1.4                                                Ao V2 mean: 128.7 cm/sec

MV dec time: 0.20 sec                                      Ao mean P.5 mmHg

                                                           Ao V2 VTI: 34.9 cm



                                                           CLAY(I,D): 1.5 cm2

                                                           CLAY(V,D): 1.3 cm2



____________________________________________________________

LV V1 max P.4 mmHg                                     SV(LVOT): 51.0 ml

LV V1 mean P.93 mmHg

LV V1 max: 59.2 cm/sec

LV V1 mean: 46.7 cm/sec

LV V1 VTI: 12.7 cm

____________________________________________________________



Med [place]: 10.8 cm/sec

Med E/e': 6.9

Lat [place]: 5.6 cm/sec

Lat E/e': 13.4





______________________________________________________________________________

Left Ventricle

Left ventricular systolic function is normal. Ejection Fraction = 55-60%.

Right Ventricle

The right ventricle is normal in size and function.

Atria

The left atrium is mildly dilated.

Mitral Valve

The mitral valve is normal in structure and function. There is no mitral valve stenosis. There is mil
d mitral

regurgitation.

Tricuspid Valve

The tricuspid valve is normal in structure and function. There is mild tricuspid regurgitation.

Aortic Valve

The aortic valve opens well. No hemodynamically significant valvular aortic stenosis. No aortic regur
gitation

is present.

Pulmonic Valve

The pulmonic valve is not well seen, but is grossly normal. There is no pulmonic valvular stenosis.

Great Vessels

The aortic root is normal size.

Pericardium/Pleura

There is no pericardial effusion.

______________________________________________________________________________





Interpretation Summary

Left ventricular systolic function is normal.

Ejection Fraction = 55-60%.

The right ventricle is normal in size and function.

The left atrium is mildly dilated.

There is mild mitral regurgitation.

There is mild tricuspid regurgitation.

There is no pericardial effusion.





MD Ya *Bonnie 2019 11:30 AM

## 2019-03-22 NOTE — CON.CARD
Consult


Consult Specialty:: cardiology


Reason for Consultation:: chest pain (post STEMI)





- History of Present Illness


Chief Complaint: PT A&Ox3; asymptomatic


History of Present Illness: 





44 yo white woman with zPMHx STEMI--> coronary angiogram showing 1 VD-->stents 

of proxiaml and mid LAD at [facility] earlier this month with repeat 

coronary angiogram done a week later when pt returned with chest pain (no 

further PCI required at that time), ,cad,  here with intermittent chest pain 

since yesterday. no f/c no numbness or tingling. took nitro, and relieved her 

pain.  pt at that time originally had only left shoulder pain.  one week 

following her stents she had a repeat cath for recurrent pain.  pt stats last 

night she was feeling pain, took nitro and it resolved. then this am she 

noticed feeling tired, and nauseas, with pain. no diaphoresis. no sob. no mod 

factors. no leg swelling. no numbness or tingling. no f/c


03/21/19 13:10





- History Source


History Provided By: Patient, Family Member, Medical Record


Limitations to Obtaining History: No Limitations





- Past Medical History


Cardio/Vascular: Yes: CHF


Pulmonary: Yes: Sleep Apnea (r/o)


...LMP: 02/28/19


...Pregnant: No


Psych: Yes: Anxiety, Panic





- Past Surgical History


Past Surgical History: Yes: Stent (proximal and mid LAD philippe STEMI)





- Alcohol/Substance Use


Hx Alcohol Use: No





- Smoking History


Smoking history: Former smoker


Have you smoked in the past 12 months: Yes


Aproximately how many cigarettes per day: 10


If you are a former smoker, when did you quit?: 2/24/19





Home Medications





- Allergies


Allergies/Adverse Reactions: 


 Allergies











Allergy/AdvReac Type Severity Reaction Status Date / Time


 


No Known Allergies Allergy   Verified 03/21/19 11:41














- Home Medications


Home Medications: 


Ambulatory Orders





Aspirin [ASA -] 81 mg PO DAILY 03/01/19 


Atorvastatin Ca [Lipitor] 80 mg PO HS 03/01/19 


Lisinopril [Prinivil] 10 mg PO DAILY 03/01/19 


Metoprolol Succinate [Toprol Xl] 100 mg PO DAILY 03/01/19 


Nitroglycerin Sublingual [Nitrostat -] 0.4 mg SL ASDIR PRN 03/01/19 


Ticagrelor [Brilinta -] 90 mg PO BID 03/01/19 


Spironolactone 12.5 mg PO DAILY 03/21/19 











Family Disease History





- Family Disease History


Family Disease History: Heart Disease: Father (MI om maggi 60s)





- Risk Factors


Known Risk Factors: Yes: Age, Family History, Hypercholesterolemia, Hypertension

, Physical Inactivity, Smoking


Vital Signs: 


 Vital Signs











Temperature  98.0 F   03/22/19 06:00


 


Pulse Rate  60   03/22/19 06:00


 


Respiratory Rate  17   03/22/19 06:00


 


Blood Pressure  110/52 L  03/22/19 06:00


 


O2 Sat by Pulse Oximetry (%)  96   03/22/19 04:00











Constitutional: Yes: Anxious


Eyes: Yes: WNL


HENT: Yes: WNL


Neck: Yes: WNL


Respiratory: Yes: WNL


Gastrointestinal: Yes: WNL


Renal/: No: Anuria


Cardiovascular: Yes: Regular Rate and Rhythm


JVD: No


Carotid Bruit: No


PMI: Non-Displaced


Heart Sounds: Yes: S1, S2, S4


Musculoskeletal: Yes: WNL


Extremities: Yes: WNL


Edema: No


Peripheral Pulses WNL: Yes


Integumentary: Yes: WNL


Neurological: Yes: WNL


...Motor Strength: WNL


Psychiatric: Yes: WNL





- Other Data


Labs, Other Data: 


 CBC, BMP





 03/22/19 06:17 





 03/22/19 06:17 





 INR, PTT











INR  1.16  (0.83-1.09)  H  03/21/19  12:23    








 Troponin, BNP











  03/21/19 03/21/19 03/21/19





  12:23 15:45 21:00


 


Troponin I  0.06 H  0.07 H  0.07 H














  03/22/19





  01:07


 


Troponin I  0.06 H








 Troponin, BNP











  03/21/19 03/21/19 03/21/19





  12:23 15:45 21:00


 


Troponin I  0.06 H  0.07 H  0.07 H














  03/22/19





  01:07


 


Troponin I  0.06 H








 Abnormal Lab Results











  03/21/19 03/22/19 03/22/19





  21:00 01:07 06:17


 


Anion Gap    7 L


 


BUN    28 H


 


Troponin I  0.07 H  0.06 H 














Imaging





- Results


Chest X-ray: Image Reviewed


EKG: Image Reviewed





Problem List





- Problems


(1) Severe anxiety with panic


Code(s): F41.0 - PANIC DISORDER [EPISODIC PAROXYSMAL ANXIETY]   





(2) Family history of coronary artery disease


Code(s): Z82.49 - FAMILY HX OF ISCHEM HEART DIS AND [facility] OF [facility]   





(3) HTN (hypertension)


Code(s): I10 - ESSENTIAL (PRIMARY) HYPERTENSION   





(4) Hyperglycemia


Code(s): R73.9 - HYPERGLYCEMIA, UNSPECIFIED   





(5) Hyperlipidemia


Code(s): E78.5 - HYPERLIPIDEMIA, UNSPECIFIED   





(6) Obese


Code(s): E66.9 - OBESITY, UNSPECIFIED   





(7) STEMI (ST elevation myocardial infarction)


Code(s): I21.3 - ST ELEVATION (STEMI) MYOCARDIAL INFARCTION OF [facility] SITE   





(8) Sedentary lifestyle


Code(s): Z91.89 - OT PERSONAL RISK FACTORS, NOT ELSEWHERE CLASSIFIED   





(9) CHF (congestive heart failure)


Assessment/Plan: 


Pt had mod-severe anteroapical and inferoapical systolic dysfunction consistent 

with wraparound LAD, with mild anterior basal hypokinesis.


Pt has been on metoprolol, lisinopril, and spironolactone since.


F/u ECHO for LVEF, regional wall motion evaluation.


Code(s): I50.9 - HEART FAILURE, UNSPECIFIED   





(10) Atypical chest pain


Assessment/Plan: 


TNI 0.06; f/u serially, and if no significant increase, and EKG (presently 

improved in STT changes from those noted during STEMI), will discharge home 

with f/u as cardiac outpatient, including cardiac rehabilitation.


Code(s): R07.89 - OTHER CHEST PAIN   





(11) Sleep apnea


Assessment/Plan: 


consider sleep studies as outpatient.


Code(s): G47.30 - SLEEP APNEA, UNSPECIFIED

## 2019-03-22 NOTE — EKG
Test Reason : 

Blood Pressure : ***/*** mmHG

Vent. Rate : 070 BPM     Atrial Rate : 070 BPM

   P-R Int : 172 ms          QRS Dur : 098 ms

    QT Int : 448 ms       P-R-T Axes : 022 259 095 degrees

   QTc Int : 483 ms

 

NORMAL SINUS RHYTHM

LOW VOLTAGE QRS

INFERIOR INFARCT (CITED ON OR BEFORE 24-FEB-2019)

ANTEROLATERAL INFARCT (CITED ON OR BEFORE 24-FEB-2019)

ABNORMAL ECG

 

Confirmed by SANDEE LAM MD ([id number]) on 3/22/2019 11:07:59 AM

 

Referred By:             Confirmed By:SANDEE LAM MD

## 2019-04-19 ENCOUNTER — HOSPITAL ENCOUNTER (OUTPATIENT)
Dept: HOSPITAL 74 - JER | Age: 44
Setting detail: OBSERVATION
LOS: 1 days | Discharge: HOME | End: 2019-04-20
Attending: HOSPITALIST | Admitting: INTERNAL MEDICINE
Payer: COMMERCIAL

## 2019-04-19 VITALS — BODY MASS INDEX: 37.8 KG/M2

## 2019-04-19 DIAGNOSIS — R00.1: ICD-10-CM

## 2019-04-19 DIAGNOSIS — Z87.891: ICD-10-CM

## 2019-04-19 DIAGNOSIS — R07.89: Primary | ICD-10-CM

## 2019-04-19 DIAGNOSIS — I50.1: ICD-10-CM

## 2019-04-19 DIAGNOSIS — I25.2: ICD-10-CM

## 2019-04-19 DIAGNOSIS — I11.0: ICD-10-CM

## 2019-04-19 DIAGNOSIS — Z95.5: ICD-10-CM

## 2019-04-19 DIAGNOSIS — E78.5: ICD-10-CM

## 2019-04-19 DIAGNOSIS — I25.10: ICD-10-CM

## 2019-04-19 DIAGNOSIS — E66.9: ICD-10-CM

## 2019-04-19 LAB
ALBUMIN SERPL-MCNC: 4.1 G/DL (ref 3.4–5)
ALP SERPL-CCNC: 94 U/L (ref 45–117)
ALT SERPL-CCNC: 33 U/L (ref 13–61)
ANION GAP SERPL CALC-SCNC: 6 MMOL/L (ref 8–16)
APTT BLD: 36.7 SECONDS (ref 25.2–36.5)
AST SERPL-CCNC: 18 U/L (ref 15–37)
BASOPHILS # BLD: 1.3 % (ref 0–2)
BILIRUB SERPL-MCNC: 0.6 MG/DL (ref 0.2–1)
BUN SERPL-MCNC: 14 MG/DL (ref 7–18)
CALCIUM SERPL-MCNC: 9.7 MG/DL (ref 8.5–10.1)
CHLORIDE SERPL-SCNC: 108 MMOL/L (ref 98–107)
CO2 SERPL-SCNC: 28 MMOL/L (ref 21–32)
CREAT SERPL-MCNC: 0.9 MG/DL (ref 0.55–1.3)
DEPRECATED RDW RBC AUTO: 15 % (ref 11.6–15.6)
EOSINOPHIL # BLD: 2.1 % (ref 0–4.5)
GLUCOSE SERPL-MCNC: 89 MG/DL (ref 74–106)
HCT VFR BLD CALC: 40.8 % (ref 32.4–45.2)
HGB BLD-MCNC: 13.4 GM/DL (ref 10.7–15.3)
INR BLD: 1.13 (ref 0.83–1.09)
LYMPHOCYTES # BLD: 29.6 % (ref 8–40)
MCH RBC QN AUTO: 28.4 PG (ref 25.7–33.7)
MCHC RBC AUTO-ENTMCNC: 32.9 G/DL (ref 32–36)
MCV RBC: 86.4 FL (ref 80–96)
MONOCYTES # BLD AUTO: 8.1 % (ref 3.8–10.2)
NEUTROPHILS # BLD: 58.9 % (ref 42.8–82.8)
PLATELET # BLD AUTO: 186 K/MM3 (ref 134–434)
PMV BLD: 10 FL (ref 7.5–11.1)
POTASSIUM SERPLBLD-SCNC: 4.4 MMOL/L (ref 3.5–5.1)
PROT SERPL-MCNC: 7.8 G/DL (ref 6.4–8.2)
PT PNL PPP: 13.3 SEC (ref 9.7–13)
RBC # BLD AUTO: 4.73 M/MM3 (ref 3.6–5.2)
SODIUM SERPL-SCNC: 142 MMOL/L (ref 136–145)
WBC # BLD AUTO: 12 K/MM3 (ref 4–10)

## 2019-04-19 PROCEDURE — G0378 HOSPITAL OBSERVATION PER HR: HCPCS

## 2019-04-19 NOTE — PDOC
History of Present Illness





- General


Chief Complaint: Chest Pain


Stated Complaint: CHEST PAIN


Time Seen by Provider: 04/19/19 22:43





- History of Present Illness


Initial Comments: 





04/20/19 01:49


43F of HTN, CAD, and MI (stents placed 2/24) here today for evaluation of chest 

pain. 


The patient reports that she has had 5 hours of constant, dull, left sided 

chest pain. Denies SOB. Denies N/V. Denies abdominal pain. She reports that her 

current pain feels like the pain she had when she had the MI except for the 


Took an aspirin this morning 82mg. 


 


Patient denies headache, lightheadedness. Denies fever, chills. Denies 

shortness of breath. Denies nausea, vomiting, diarrhea, abdominal pain. Denies 

lower extremity edema. 


No relief of the pain when she bends forward. 


Allergies: NKA


Cardiologist: Zay Stoll











Past History





- Past Medical History


Allergies/Adverse Reactions: 


 Allergies











Allergy/AdvReac Type Severity Reaction Status Date / Time


 


No Known Allergies Allergy   Verified 03/21/19 11:41











Home Medications: 


Ambulatory Orders





Aspirin [ASA -] 81 mg PO DAILY 03/01/19 


Atorvastatin Ca [Lipitor] 80 mg PO HS 03/01/19 


Lisinopril [Prinivil] 10 mg PO DAILY 03/01/19 


Metoprolol Succinate [Toprol Xl] 100 mg PO DAILY 03/01/19 


Ticagrelor [Brilinta -] 90 mg PO BID 03/01/19 


Spironolactone 12.5 mg PO DAILY 03/21/19 








Cardiac Disorders: Yes (MI W/ STENTS X2)


COPD: No


HTN: Yes





- Surgical History


Cardiac Surgery: Yes (STENTS X2)





- Immunization History


Immunization Up to Date: Yes





- Suicide/Smoking/Psychosocial Hx


Smoking History: Unknown if ever smoked


Have you smoked in the past 12 months: Yes


Number of Cigarettes Smoked Daily: 10


If you are a former smoker, when did you quit?: 2/24/19


'Breaking Loose' booklet given: 03/21/19


Hx Alcohol Use: No


Drug/Substance Use Hx: No


Substance Use Type: Marijuana


Hx Substance Use Treatment: No





Cardiac Specific PMH





- Complaint Specific PMHX


Abdominal Aortic Aneurysm: No


Angina: No


Cardiac Arrhythmia: No


Cardiac Stent: Yes (2/24/19, 2x stents inc LAD)





**Review of Systems





- Review of Systems


Able to Perform ROS?: Yes


Is the patient limited English proficient: No


Constitutional: No: Symptoms Reported


HEENTM: No: Symptoms Reported


Respiratory: No: Symptoms reported


Cardiac (ROS): Yes: See HPI


ABD/GI: No: Symptoms Reported


: No: Symptoms Reported


Musculoskeletal: No: Symptoms Reported


All Other Systems: Reviewed and Negative





*Physical Exam





- Vital Signs


 Last Vital Signs











Temp Pulse Resp BP Pulse Ox


 


 98.1 F   51 L  20   109/62   97 


 


 04/19/19 22:04  04/19/19 22:09  04/19/19 22:04  04/19/19 22:09  04/19/19 22:09














- Physical Exam


General Appearance: Yes: Appropriately Dressed, Obese.  No: Apparent Distress


HEENT: positive: EOMI, DEBORAH, Normal ENT Inspection


Respiratory/Chest: positive: Lungs Clear, Normal Breath Sounds.  negative: 

Chest Tender, Respiratory Distress


Cardiovascular: positive: S1, S2, Bradycardia.  negative: Regular Rhythm


Gastrointestinal/Abdominal: positive: Normal Bowel Sounds, Flat, Soft.  negative

: Tender


Musculoskeletal: positive: Normal Inspection.  negative: CVA Tenderness


Extremity: positive: Normal Capillary Refill, Normal Inspection, Normal Range 

of Motion


Integumentary: positive: Normal Color, Dry, Warm


Neurologic: positive: Fully Oriented, Alert, Normal Mood/Affect, Normal Response

, Motor Strength 5/5





ED Treatment Course





- LABORATORY


CBC & Chemistry Diagram: 


 04/19/19 23:12





 04/19/19 23:12





- ADDITIONAL ORDERS


Additional order review: 


 Laboratory  Results











  04/19/19 04/19/19 04/19/19





  23:18 23:12 23:12


 


PT with INR    13.30 H


 


INR    1.13 H


 


PTT (Actin FS)    36.7 H


 


Sodium   


 


Potassium   


 


Chloride   


 


Carbon Dioxide   


 


Anion Gap   


 


BUN   


 


Creatinine   


 


Creat Clearance w eGFR   


 


Random Glucose   


 


Calcium   


 


Total Bilirubin   


 


AST   


 


ALT   


 


Alkaline Phosphatase   


 


Troponin I   


 


Total Protein   


 


Albumin   


 


TSH   1.25 


 


Blood Type  O POSITIVE  


 


Antibody Screen  Negative  














  04/19/19





  23:12


 


PT with INR 


 


INR 


 


PTT (Actin FS) 


 


Sodium  142


 


Potassium  4.4


 


Chloride  108 H


 


Carbon Dioxide  28


 


Anion Gap  6 L


 


BUN  14


 


Creatinine  0.9


 


Creat Clearance w eGFR  68.66


 


Random Glucose  89


 


Calcium  9.7


 


Total Bilirubin  0.6


 


AST  18


 


ALT  33


 


Alkaline Phosphatase  94


 


Troponin I  < 0.02


 


Total Protein  7.8


 


Albumin  4.1


 


TSH 


 


Blood Type 


 


Antibody Screen 








 











  04/19/19





  23:12


 


RBC  4.73


 


MCV  86.4


 


MCHC  32.9


 


RDW  15.0


 


MPV  10.0


 


Neutrophils %  58.9


 


Lymphocytes %  29.6


 


Monocytes %  8.1


 


Eosinophils %  2.1


 


Basophils %  1.3














- RADIOLOGY


Radiology Studies Ordered: 














 Category Date Time Status


 


 CHEST PA & LAT [RAD] Stat Radiology  04/20/19 00:01 Taken














- Medications


Given in the ED: 


ED Medications














Discontinued Medications














Generic Name Dose Route Start Last Admin





  Trade Name Brianna  PRN Reason Stop Dose Admin


 


Aspirin  325 mg  04/20/19 00:47  04/20/19 01:14





  Asa -  PO  04/20/19 00:48  Not Given





  ONCE ONE   





     





     





     





     


 


Aspirin  240 mg  04/20/19 01:08  04/20/19 01:14





  Asa -  PO  04/20/19 01:09  240 mg





  ONCE ONE   Administration





     





     





     





     














Medical Decision Making





- Medical Decision Making





04/20/19 01:57


43f with pmh of recent MI presenting with chest pain. 


Dressler syndrome vs angina vs non-cardiac etiology. 





EKG: Sinus rhythm with PVC's and bigeminy. 


Negative trops. 


Spoke to Dr. Rahman, cardiologist on call who recommended admission to tele obs

, low suspicion for angina due to negative trops after 8 hours. 











Patient showed us EKG from 1 week ago that she had photographed on her phone, 

no changes compared to today's. 


Patient now asymptomatic.








*DC/Admit/Observation/Transfer


Diagnosis at time of Disposition: 


 Chest pain








- Discharge Dispostion


Decision to Admit order: Yes


Decision to Admit order Date/Time: 


Decision to Admit Order











 Category Date Time Status


 


 Decision to Admit to Hospital Routine Admission  04/20/19 02:04 Ordered














- Referrals





- Patient Instructions





- Post Discharge Activity

## 2019-04-20 VITALS — SYSTOLIC BLOOD PRESSURE: 106 MMHG | TEMPERATURE: 98 F | HEART RATE: 58 BPM | DIASTOLIC BLOOD PRESSURE: 60 MMHG

## 2019-04-20 PROCEDURE — 3E013GC INTRODUCTION OF OTHER THERAPEUTIC SUBSTANCE INTO SUBCUTANEOUS TISSUE, PERCUTANEOUS APPROACH: ICD-10-PCS | Performed by: HOSPITALIST

## 2019-04-20 NOTE — PDOC
Documentation entered by Charli Peterson SCRIBE, acting as scribe for Gus Matthews MD.








Gus Matthews MD:  This documentation has been prepared by the Nicholas king Daniel, SCRIBE, under my direction and personally reviewed by me in its entirety.  I 

confirm that the documentation accurately reflects all work, treatment, 

procedures, and medical decision making performed by me.  





Attending Attestation





- Resident


Resident Name: Deven Parkinson





- ED Attending Attestation


I have performed the following: I have examined & evaluated the patient, The 

case was reviewed & discussed with the resident, I agree w/resident's findings 

& plan, Exceptions are as noted





- HPI


HPI: 





04/19/19 23:01


The patient is a 43 year old female with a past medical history of HTN, CAD, 

and MI (stents placed 2/24) here today for evaluation of chest pain. The 

patient reports that she has had 5 hours of constant, dull, left sided chest 

pain. Denies SOB. Denies N/V. Denies abdominal pain. She reports that her 

current pain feels like the pain she had when she had the MI. 


 


Patient denies headache, lightheadedness. Denies fever, chills. Denies 

shortness of breath. Denies nausea, vomiting, diarrhea, abdominal pain. Denies 

lower extremity edema. 





Allergies: NKA


Cardiologist: Zay Stoll








- Physicial Exam


PE: 





04/20/19 00:31


"GENERAL: Awake, alert, and fully oriented, in no acute distress.


HEAD: No signs of trauma


EYES: PERRLA, EOMI, sclera anicteric, conjunctiva clear


ENT: Auricles normal inspection, hearing grossly normal, nares patent, 

oropharynx clear without exudates. Moist mucosa


NECK: Nontender, no stepoffs, Normal ROM, supple, no lymphadenopathy, JVD, or 

masses


LUNGS: Breath sounds equal, clear to auscultation bilaterally.  No wheezes, and 

no crackles


HEART: Regular rate and rhythm, normal S1 and S2, no murmurs, rubs or gallops


ABDOMEN: Soft, nontender, normoactive bowel sounds.  No guarding, no rebound.  

No masses


EXTREMITIES: Normal range of motion, no edema.  No clubbing or cyanosis. No 

cords, erythema, or tenderness


NEUROLOGICAL: Cranial nerves II through XII intact. 5/5 strength and sensation 

in all extremities, Normal speech, normal gait, normal cerebellar function


SKIN: Warm, Dry, normal turgor, no rashes or lesions noted.





- Medical Decision Making





04/20/19 00:31


43 F with chest pain reminiscent of her recent MI. Pt's EKG today shows 

bigeminy but no new ischemic changes compared to EKG done 1 month ago.


- Labs, trop, BNP


- CXR





04/20/19 01:09


trop negative


Case discussed with cards, who recommends tele obs for serial trops. No 

indication for txfer for cath at this time.

## 2019-04-20 NOTE — CONS
DATE OF CONSULTATION:  04/20/2019

 

REQUESTING PHYSICIAN:  Hospitalist service.  Coverage for Dr. Zay Stoll.

 

CHIEF COMPLAINT:  Chest discomfort, cardiovascular evaluation.

 

This is a 43-year-old, obese  female with known history of coronary artery

disease status post recent EH-uxcuppr-xzcwhbglx myocardial infarction status post

percutaneous coronary intervention, TAO, LAD; angina pectoris; systolic/diastolic

left ventricular dysfunction with clinical class 0 [place] Heart Association

classification left ventricular failure; hypertensive cardiovascular disease;

hypercholesterolemia; tobacco abuse until recently; who presented to [facility] Emergency Room with recurrent chest discomfort.  Patient was

recently evaluated for chest discomfort at [facility] Emergency

Room and subsequently was discharged.  Post percutaneous coronary intervention,

patient, in addition, had recurrent chest pain.  Repeat left heart cardiac

catheterization, coronary angiography was performed which revealed patent stent. 

Chest discomfort has been described as retrosternal tightness which is transitory,

and it subsides spontaneously within a few minutes.  Patient reported radiation to

the left shoulder.  Patient denied any associated symptomatology, i.e. diaphoresis. 

Patient denies any dyspnea, orthopnea, paroxysmal nocturnal dyspnea, or peripheral

edema.  Patient has been reporting intermittent palpitations.  Patient denies any

dizziness or lightheadedness.

 

PAST MEDICAL HISTORY:  Coronary artery disease status post recent

HX-svgofnf-lnchtsoca myocardial infarction, post percutaneous coronary intervention,

TAO, LAD; angina pectoris; systolic/diastolic left ventricular dysfunction with

clinical class 0 [place] Heart Association classification left ventricular failure;

hypertensive cardiovascular disease; hypercholesterolemia.

 

PAST SURGICAL HISTORY:  None.

 

SOCIAL HISTORY:  A smoker up until recently.

 

FAMILY HISTORY:  Brother has coronary artery disease.

 

ALLERGIES:  None reported.

 

MEDICAL THERAPY AT HOME:  Included Ecotrin 81 mg once a day, Lipitor 80 mg once a

day, lisinopril 20 mg once a day, metoprolol succinate 100 mg once a day, Brilinta 90

mg twice a day, Aldactone 12.5 mg once a day.

 

REVIEW OF SYSTEMS:

Head and Neck:  Denies headache, photophobia, blurring of vision.

Respiratory:  No cough or sputum production.

Cardiovascular:  As noted above.

Gastrointestinal:  No nausea, vomiting, diarrhea, abdominal discomfort.

Genitourinary:  No symptoms reported.

Musculoskeletal:  No symptoms reported.

 

PHYSICAL EXAMINATION:

Vital Signs:   Blood pressure is 102/84 mmHg, pulse rate is 61 beats per minute.

Head and Neck:  Pupils reactive to light and accommodation.  Extraocular muscles are

intact.  Anicteric sclerae.  Negative JVD.  No bruit appreciated.

Chest:  Clear to auscultation and percussion.

Cardiovascular:  S1 and S2 regular.  Grade 1/6 systolic ejection murmur.  No clicks

or gallops.

Abdomen:  Soft, benign.  Normoactive bowel sounds.

Extremities:  Negative edema.  Intact distal pulses.  No calf tenderness

 

Electrocardiogram revealed sinus rhythm with premature ventricular contractions;

evidence of an anteroseptal wall myocardial infarction, age undetermined; and

non-specific ST segment T-wave abnormality.

 

CPK, troponin-I levels were noted.  CBC revealed a white cell count 12.0, hemoglobin

13.4, platelet count 186.  Basic metabolic profile revealed sodium 142, potassium

4.4, BUN 14, creatinine 0.9, glucose 89.  INR 1.13.

 

ASSESSMENT:

1.  Chest pain syndrome, atypical for angina pectoris.  Differential diagnosis

includes gastroesophageal reflux disease versus musculoskeletal discomfort.

2.  Coronary artery disease post SB-xosfofr-kddmbzqfr myocardial infarction, post

left anterior descending drug-eluting stent; angina pectoris.

3.  Left ventricular systolic/diastolic dysfunction with clinical class 0 [place]

Heart Association classification left ventricular failure.

4.  Hypertensive cardiovascular disease.

5.  Hypercholesterolemia.

6.  Exogenous obesity.

7.  Prior history of tobacco abuse.

 

RECOMMENDATION:

1.  Continuation of Toprol-XL therapy.

2.  Continuation of lisinopril therapy at the current dosage.

3.  Continuation of Lipitor therapy.

4.  Continuation of aspirin and Brilinta therapies.

5.  Continuation of Aldactone therapy.

6.  Addition of proton pump inhibitor or H2 blocker therapy considering the

above-noted presentation.

7.  Patient can be discharged home from the cardiovascular point of view and to

follow up with Dr. Zay Stoll this coming week and to continue cardiac

rehabilitation program which has been initiated.

8.  Echocardiography study which was performed recently, dated March 22, 2019,

revealed normal left ventricular size and systolic function with estimated LVEF

between 55% to 60%, normal right ventricular size and systolic function, mild left

atrial dilatation, mild mitral and tricuspid valve regurgitation.

 

Thank you for your kind referral.

 

 

JEFF EASTMAN M.D.

 

[id number]

DD: 04/20/2019 11:59

DT: 04/20/2019 12:25

Job #:  [id number]

## 2019-04-20 NOTE — EKG
Test Reason : 

Blood Pressure : ***/*** mmHG

Vent. Rate : 076 BPM     Atrial Rate : 076 BPM

   P-R Int : 164 ms          QRS Dur : 098 ms

    QT Int : 416 ms       P-R-T Axes : 044 239 098 degrees

   QTc Int : 468 ms

 

SINUS RHYTHM WITH FREQUENT PREMATURE VENTRICULAR COMPLEXES IN A

PATTERN OF BIGEMINY

POSSIBLE LEFT ATRIAL ENLARGEMENT

LOW VOLTAGE QRS

INFERIOR INFARCT (CITED ON OR BEFORE 24-FEB-2019)

ANTEROLATERAL INFARCT (CITED ON OR BEFORE 24-FEB-2019)

ABNORMAL ECG

 

Confirmed by MD TIMO, MAUREEN ([id number]) on 4/20/2019 2:11:04 PM

 

Referred By:             Confirmed By:MAUREEN GREENWOOD MD

## 2019-04-20 NOTE — HP
CHIEF COMPLAINT: chest pain 





PCP:





HISTORY OF PRESENT ILLNESS:


43F of HTN, CAD, and MI (2 drug eluding stents placed 2/24) c/o chest pain 

which started around 5pm on 4/19 while patient was resting. Patient was about 2/

10, no radiation, did not take NGL for it. Pain subsided by itself. Does not 

use cocaine. Reports compliance with Brillanta and ASA. 








ER course was notable for:


(1) ekg


(2) cxr 


(3) asa





Recent Travel: no 





PAST MEDICAL HISTORY: HTN, CAD, and MI (2 stents placed 2/24-drug eluding)





PAST SURGICAL HISTORY:no





Social History:


Smoking: quit february 2019 


Alcohol:no


Drugs: no





Family History: father with unspecified heart disease in his 40s 


Allergies





No Known Allergies Allergy (Verified 03/21/19 11:41)


 








HOME MEDICATIONS:


 Home Medications











 Medication  Instructions  Recorded


 


Aspirin [ASA -] 81 mg PO DAILY 03/01/19


 


Atorvastatin Ca [Lipitor] 80 mg PO HS 03/01/19


 


Lisinopril [Prinivil] 10 mg PO DAILY 03/01/19


 


Metoprolol Succinate [Toprol Xl] 100 mg PO DAILY 03/01/19


 


Ticagrelor [Brilinta -] 90 mg PO BID 03/01/19


 


Spironolactone 12.5 mg PO DAILY 03/21/19








REVIEW OF SYSTEMS


CONSTITUTIONAL: 


Absent:  fever, chills, diaphoresis, generalized weakness, malaise, loss of 

appetite, weight change


HEENT: 


Absent:  rhinorrhea, nasal congestion, throat pain, throat swelling, difficulty 

swallowing, mouth swelling, ear pain, eye pain, visual changes


CARDIOVASCULAR: 


Absent:, syncope, palpitations, irregular heart rate, lightheadedness, 

peripheral edema


present-  chest pain


RESPIRATORY: 


Absent: cough, shortness of breath, dyspnea with exertion, orthopnea, wheezing, 

stridor, hemoptysis


GASTROINTESTINAL:


Absent: abdominal pain, abdominal distension,, vomiting, diarrhea, constipation

, melena, hematochezia


present-  nausea


GENITOURINARY: 


Absent: dysuria, frequency, urgency, hesitancy, hematuria, flank pain, genital 

pain


MUSCULOSKELETAL: 


Absent: myalgia, arthralgia, joint swelling, back pain, neck pain


SKIN: 


Absent: rash, itching, pallor


HEMATOLOGIC/IMMUNOLOGIC: 


Absent: easy bleeding, easy bruising, lymphadenopathy, frequent infections


ENDOCRINE:


Absent: unexplained weight gain, unexplained weight loss, heat intolerance, 

cold intolerance


NEUROLOGIC: 


Absent: headache, focal weakness or paresthesias, dizziness, unsteady gait, 

seizure, mental status changes, bladder or bowel incontinence


PSYCHIATRIC: 


Absent: anxiety, depression, suicidal or homicidal ideation, hallucinations.








PHYSICAL EXAMINATION


 Vital Signs - 24 hr











  04/19/19 04/19/19





  22:04 22:09


 


Temperature 98.1 F 


 


Pulse Rate 39 L 


 


Pulse Rate [  51 L





Right Radial]  


 


Respiratory 20 





Rate  


 


Blood Pressure 151/53 L 


 


Blood Pressure  109/62





[Right Arm]  


 


O2 Sat by Pulse 97 97





Oximetry (%)  











GENERAL: Awake, alert, and fully oriented, in no acute distress, obese


HEAD: Normal with no signs of trauma.


EYES: Pupils equal, round and reactive to light, extraocular movements intact, 

sclera anicteric, conjunctiva clear. No lid lag.


EARS, NOSE, THROAT: Ears normal, nares patent, oropharynx clear without 

exudates. Moist mucous membranes.


NECK: Normal range of motion, supple without lymphadenopathy, JVD, or masses.


LUNGS: Breath sounds equal, clear to auscultation bilaterally. No wheezes, and 

no crackles. No accessory muscle use.


HEART: Regular rate and rhythm, normal S1 and S2 without murmur, rub or gallop.


ABDOMEN: Soft, obese nontender, not distended, normoactive bowel sounds, no 

guarding, no rebound, no masses. 


MUSCULOSKELETAL: Normal range of motion at all joints. No bony deformities or 

tenderness. No CVA tenderness.


UPPER EXTREMITIES: 2+ pulses, warm, well-perfused. No cyanosis. No clubbing. No 

peripheral edema.


LOWER EXTREMITIES: 2+ pulses, warm, well-perfused. No calf tenderness. No 

peripheral edema. 


NEUROLOGICAL:  Cranial nerves II-XII intact. Normal speech. 


PSYCHIATRIC: Cooperative. Good eye contact. Appropriate mood and affect.


SKIN: Warm, dry, normal turgor, no rashes or lesions noted, normal capillary 

refill. 





 Laboratory Results - last 24 hr











  04/19/19 04/19/19 04/19/19





  23:12 23:12 23:12


 


WBC  12.0 H  


 


RBC  4.73  


 


Hgb  13.4  


 


Hct  40.8  


 


MCV  86.4  


 


MCH  28.4  


 


MCHC  32.9  


 


RDW  15.0  


 


Plt Count  186  D  


 


MPV  10.0  


 


Absolute Neuts (auto)  7.1  


 


Neutrophils %  58.9  


 


Lymphocytes %  29.6  


 


Monocytes %  8.1  


 


Eosinophils %  2.1  


 


Basophils %  1.3  


 


Nucleated RBC %  0  


 


PT with INR    13.30 H


 


INR    1.13 H


 


PTT (Actin FS)    36.7 H


 


Sodium   142 


 


Potassium   4.4 


 


Chloride   108 H 


 


Carbon Dioxide   28 


 


Anion Gap   6 L 


 


BUN   14 


 


Creatinine   0.9 


 


Creat Clearance w eGFR   68.66 


 


Random Glucose   89 


 


Calcium   9.7 


 


Total Bilirubin   0.6 


 


AST   18 


 


ALT   33 


 


Alkaline Phosphatase   94 


 


Troponin I   < 0.02 


 


Total Protein   7.8 


 


Albumin   4.1 


 


TSH   


 


Blood Type   


 


Antibody Screen   














  04/19/19 04/19/19





  23:12 23:18


 


WBC  


 


RBC  


 


Hgb  


 


Hct  


 


MCV  


 


MCH  


 


MCHC  


 


RDW  


 


Plt Count  


 


MPV  


 


Absolute Neuts (auto)  


 


Neutrophils %  


 


Lymphocytes %  


 


Monocytes %  


 


Eosinophils %  


 


Basophils %  


 


Nucleated RBC %  


 


PT with INR  


 


INR  


 


PTT (Actin FS)  


 


Sodium  


 


Potassium  


 


Chloride  


 


Carbon Dioxide  


 


Anion Gap  


 


BUN  


 


Creatinine  


 


Creat Clearance w eGFR  


 


Random Glucose  


 


Calcium  


 


Total Bilirubin  


 


AST  


 


ALT  


 


Alkaline Phosphatase  


 


Troponin I  


 


Total Protein  


 


Albumin  


 


TSH  1.25 


 


Blood Type   O POSITIVE


 


Antibody Screen   Negative





EKG - q waves in anterior -lateral leads, ventricular bigeminy 


cxr reviewed 





ASSESSMENT/PLAN:


#Chest pain in high risk patient  


-tele/obs 


-trend troponin 


-NGL SL prn 


-echo 


-consult cardiology 


-ASA 


-Brillanta 


-high dose statin





#Bradycardia- asymptomatic? - may be side effect of Toprolol 


-monitor on telemetry 


-no Bblockers or CCB 





#HTN 


-lisinopril 





#DVT ppx 


-heparin sc 





Visit type





- Emergency Visit


Emergency Visit: Yes


ED Registration Date: 04/20/19


Care time: The patient presented to the Emergency Department on the above date 

and was hospitalized for further evaluation of their emergent condition.





- New Patient


This patient is new to me today: Yes


Date on this admission: 04/20/19





- Critical Care


Critical Care patient: No

## 2019-04-20 NOTE — EKG
Test Reason : 

Blood Pressure : ***/*** mmHG

Vent. Rate : 072 BPM     Atrial Rate : 047 BPM

   P-R Int : 198 ms          QRS Dur : 096 ms

    QT Int : 454 ms       P-R-T Axes : 025 093 078 degrees

   QTc Int : 497 ms

 

SINUS BRADYCARDIA WITH FREQUENT PREMATURE VENTRICULAR COMPLEXES

CANNOT RULE OUT INFERIOR INFARCT (CITED ON OR BEFORE 24-FEB-2019)

ANTEROLATERAL INFARCT (CITED ON OR BEFORE 24-FEB-2019)

ABNORMAL ECG

 

Confirmed by MD TIMO, MAUREEN ([id number]) on 4/20/2019 2:09:46 PM

 

Referred By:             Confirmed By:MAUREEN GREENWOOD MD

## 2019-04-20 NOTE — PN
Progress Note (short form)





- Note


Progress Note: 


Coverage for Dr. TRISH Stoll





Chief Complaint: Chart reviewed, events noted, recurrent atypical chest pain


 


History of Present Illness: 


Seen and examined on telemetry. Full consult dictated





Echocardiography noted revealed normal LV size and function with LVEF 55-60%, 

mild MR and TR





Medications: 


 Current Medications





Aspirin (Asa -)  81 mg PO DAILY [facility]


   Last Admin: 04/20/19 10:17 Dose:  81 mg


Atorvastatin Calcium (Lipitor -)  80 mg PO HS [facility]


Heparin Sodium (Porcine) (Heparin -)  5,000 unit SQ BID [facility]


   Last Admin: 04/20/19 10:17 Dose:  5,000 unit


Lisinopril (Prinivil)  10 mg PO DAILY [facility]


   Last Admin: 04/20/19 10:17 Dose:  10 mg


Metoprolol Succinate (Toprol Xl -)  75 mg PO DAILY [facility]


Nitroglycerin (Nitrostat -)  0.4 mg SL Q5M PRN


   PRN Reason: FOR CHEST PAIN


Spironolactone (Aldactone -)  12.5 mg PO DAILY [facility]


Ticagrelor (Brilinta -)  90 mg PO BID [facility]


   Last Admin: 04/20/19 10:40 Dose:  90 mg





 Home Medications











 Medication  Instructions  Recorded


 


Aspirin [ASA -] 81 mg PO DAILY 03/01/19


 


Atorvastatin Ca [Lipitor] 80 mg PO HS 03/01/19


 


Lisinopril [Prinivil] 10 mg PO DAILY 03/01/19


 


Metoprolol Succinate [Toprol Xl] 100 mg PO DAILY 03/01/19


 


Ticagrelor [Brilinta -] 90 mg PO BID 03/01/19


 


Spironolactone 12.5 mg PO DAILY 03/21/19


 


Atorvastatin Ca [Lipitor] 80 mg PO HS  tablet 04/20/19


 


Nitroglycerin Sublingual 0.4 mg SL Q5M PRN  tab 04/20/19





[Nitrostat -]  














Review of Systems





Cardiovascular: As noted above


Respiratory: denies: Cough or Sputum Production 


Gastrointestinal: denies: Nausea, Vomiting, Diarrhea, Constipation or Abdominal 

Discomfort 


Musculoskeletal: No Symptoms Reported


Endocrine: No Symptoms Reported





Vital Signs: 


 


 Last Vital Signs











Temp Pulse Resp BP Pulse Ox


 


 98.0 F   61   18   102/84   100 


 


 04/20/19 09:04  04/20/19 09:04  04/20/19 09:04  04/20/19 09:04  04/20/19 09:04








 Intake & Output











 04/17/19 04/18/19 04/19/19 04/20/19





 23:59 23:59 23:59 23:59


 


Weight   227 lb 227 lb 3.2 oz











Constitutional: No Distress, Calm, Thin


Neck: Supple Negative JVD


Respiratory: Clear to A&P Bilaterally 


Cardiovascular: S1 S2 Regular Rate and Rhythm Grade 1/6 JESSICA


Gastrointestinal: Soft Benign Normal Bowel Sounds


Ext: No Edema





Labs: 


 


 Troponin, BNP











  04/19/19 04/20/19





  23:12 05:10


 


Troponin I  < 0.02  0.02








 CBC, BMP





 04/19/19 23:12 





 04/19/19 23:12 





 Hepatic Panel











Total Bilirubin  0.6 mg/dL (0.2-1)   04/19/19  23:12    


 


AST  18 U/L (15-37)   04/19/19  23:12    


 


ALT  33 U/L (13-61)   04/19/19  23:12    


 


Alkaline Phosphatase  94 U/L ()   04/19/19  23:12    


 


Albumin  4.1 g/dl (3.4-5.0)   04/19/19  23:12    








 INR, PTT











INR  1.13  (0.83-1.09)  H  04/19/19  23:12    











Assessment/Plan





ASSESSMENT:





1. Chest pain syndrome atypical for angina pectoris, differential includes GERD 

vs. musculo-skeletal


2. CAD post STEMI post LAD/TAO angina pectoris


3. LV systolic/diastolic dysfunction with clinical class 0 NYHA classification 

LV failure


4. HTN/HCVD


5. Hyperlipidemia


6. Exogenous obesity


7. Prior history of tobacco abuse





PLAN:





1. Continue Toprol XL


2. Continue Lisinopril 


3. Continue Lipitor 


4. Continue ASA and Brilinta


5. Continue Aldactone


6. Add PPI or H2 blockers


7. Can be D/C home from the cardiovascular point of view and F/U with Dr. TRISH Stoll this coming week and to continue cardiac rehab





Thank you for the consult 














Demarcus Falk M.D.

## 2021-09-14 NOTE — CON.CARD
Consult


Consult Specialty:: CARDIOLOGY


Reason for Consultation:: chest pain; STEMI





- History of Present Illness


Chief Complaint: Pt A&Ox3; +left-sided chest pain, though mild now.  and 

mother are at bedside.


History of Present Illness: 





The patient is a 43 year old white woman with a PMH of obesity, long-term 

cigarette smoking, family hx CAD (father had MI),sedentary lifestyle, and HTN (

pt did not know she had HTN until now), who presents to the ER with persistent 

left sided arm discomfort and difficulty sleeping secondary to the discomfort 

for the past 3 days (she waited to see MD because she thought it was the same 

pain she usually has when she sleeps on lieft side; however, this was more 

severe and kept her from sleeping). Patient was seen by an urgent care at 

[facility] and was sent to the ED for further evaluation of abnormal EKG. 


 


The patient denies chest pain, shortness of breath, headache and dizziness.


Denies fever, chills, nausea, vomit, diarrhea and constipation.


Denies dysuria, frequency, urgency and hematuria.


 


Allergies: NKA


Past surgical history: None reported. 


Social history: No reported drug or alcohol use. Every day smoker. 





Denies diabetes, but glucse elevated today.





- History Source


History Provided By: Patient, Family Member, Medical Record


Limitations to Obtaining History: No Limitations





- Smoking History


Smoking history: Current every day smoker


Aproximately how many cigarettes per day: 10





Home Medications





- Allergies


Allergies/Adverse Reactions: 


 Allergies











Allergy/AdvReac Type Severity Reaction Status Date / Time


 


No Known Allergies Allergy   Verified 02/24/19 15:33














- Home Medications


Home Medications: 


Ambulatory Orders





NK [No Known Home Medication]  02/24/19 











Family Disease History





- Family Disease History


Family Disease History: Heart Disease: Father (MI om maggi 60s)





Review of Systems





- Review of Systems


Constitutional: reports: No Symptoms


Eyes: reports: No Symptoms


HENT: reports: No Symptoms


Neck: reports: No Symptoms


Cardiovascular: reports: Chest Pain


Respiratory: reports: No Symptoms


Gastrointestinal: reports: No Symptoms


Genitourinary: reports: No Symptoms


Breasts: reports: No Symptoms Reported


Musculoskeletal: reports: No Symptoms


Integumentary: reports: No Symptoms


Neurological: reports: No Symptoms


Endocrine: reports: No Symptoms


Hematology/Lymphatic: reports: No Symptoms


Psychiatric: reports: No Symptoms





- Risk Factors


Known Risk Factors: Yes: Age, Family History, Hypercholesterolemia, Hypertension

, Physical Inactivity, Smoking.  No: Diabetes Mellitus


Vital Signs: 


 Vital Signs











Temperature      


 


Pulse Rate  126 H  02/24/19 16:41


 


Respiratory Rate  20   02/24/19 16:41


 


Blood Pressure  170/106 H  02/24/19 16:41


 


O2 Sat by Pulse Oximetry (%)  99   02/24/19 16:40








 Abnormal Lab Results











  02/24/19 02/24/19 02/24/19





  15:30 15:30 15:30


 


WBC  19.8 H  


 


RBC  5.50 H  


 


Hgb  16.0 H  


 


Hct  46.7 H  


 


Absolute Neuts (auto)  13.3 H  


 


PT with INR   13.60 H 


 


INR   1.15 H 


 


Random Glucose    109 H


 


AST    237 H


 


Creatine Kinase    1869 H


 


Creatine Kinase Index    8.2 H*


 


CK-MB (CK-2)    152.8 H


 


Troponin I    28.40 H*














  02/24/19 02/24/19





  15:30 15:30


 


WBC  


 


RBC  


 


Hgb  


 


Hct  


 


Absolute Neuts (auto)  


 


PT with INR  


 


INR  


 


Random Glucose  


 


AST  


 


Creatine Kinase  


 


Creatine Kinase Index  


 


CK-MB (CK-2)  153.0 H  153 H


 


Troponin I  











Constitutional: Yes: Anxious, Obese


Eyes: Yes: WNL


HENT: Yes: WNL


Neck: Yes: WNL


Respiratory: Yes: Diminished


Gastrointestinal: Yes: Soft, Abdomen, Obese


Renal/: No: Anuria


Cardiovascular: Yes: Tachycardia


JVD: No


Carotid Bruit: No


PMI: Non-Displaced


Heart Sounds: Yes: S1, S2, S4


Musculoskeletal: Yes: WNL


Extremities: Yes: WNL


Edema: No


Integumentary: Yes: WNL


Neurological: Yes: WNL


...Motor Strength: WNL


Psychiatric: Yes: WNL





- Other Data


Labs, Other Data: 


 CBC, BMP





 02/24/19 15:30 





 02/24/19 15:30 





 INR, PTT











INR  1.15  (0.83-1.09)  H  02/24/19  15:30    








 Troponin, BNP











  02/24/19





  15:30


 


Troponin I  28.40 H*








 Troponin, BNP











  02/24/19





  15:30


 


Troponin I  28.40 H*








 Abnormal Lab Results











  02/24/19 02/24/19 02/24/19





  15:30 15:30 15:30


 


WBC  19.8 H  


 


RBC  5.50 H  


 


Hgb  16.0 H  


 


Hct  46.7 H  


 


Absolute Neuts (auto)  13.3 H  


 


PT with INR   13.60 H 


 


INR   1.15 H 


 


Random Glucose    109 H


 


AST    237 H


 


Creatine Kinase    1869 H


 


Creatine Kinase Index    8.2 H*


 


CK-MB (CK-2)    152.8 H


 


Troponin I    28.40 H*














  02/24/19 02/24/19





  15:30 15:30


 


WBC  


 


RBC  


 


Hgb  


 


Hct  


 


Absolute Neuts (auto)  


 


PT with INR  


 


INR  


 


Random Glucose  


 


AST  


 


Creatine Kinase  


 


Creatine Kinase Index  


 


CK-MB (CK-2)  153.0 H  153 H


 


Troponin I  














Imaging





- Results


Chest X-ray: Image Reviewed


EKG: Image Reviewed (STEMI)





Problem List





- Problems


(1) HTN (hypertension)


Code(s): I10 - ESSENTIAL (PRIMARY) HYPERTENSION   





(2) Obese


Code(s): E66.9 - OBESITY, UNSPECIFIED   





(3) Hyperglycemia


Code(s): R73.9 - HYPERGLYCEMIA, UNSPECIFIED   





(4) Hyperlipidemia


Code(s): E78.5 - HYPERLIPIDEMIA, UNSPECIFIED   





(5) Sedentary lifestyle


Code(s): Z91.89 - OTH PERSONAL RISK FACTORS, NOT ELSEWHERE CLASSIFIED   





(6) Family history of coronary artery disease


Code(s): Z82.49 - FAMILY HX OF ISCHEM HEART DIS AND OTH DIS OF THE [facility] S[facility]   





(7) STEMI (ST elevation myocardial infarction)


Assessment/Plan: 


Marked ST elevation anterolaterally.


Tropinin 28; CK > 1,800, with CKMB relative index >8.





Plan:


 mg PO.


IV heparin.


Tacagrelor.


Atorvastatin 80 mg.


Pt for emergent [facility] for coronary angi[facility].








Code(s): I21.3 - ST ELEVATION (STEMI) MYOCARDIAL INFARCTION OF [facility] SITE Medical Week 3 Survey      Responses   [facility] patient discharged from?  [facility]   Does the patient have one of the following disease processes/diagnoses(primary or secondary)?  Other   Week 3 attempt successful?  No   Unsuccessful attempts  Attempt 1          Love Gómez RN

## 2021-09-17 ENCOUNTER — HOSPITAL ENCOUNTER (OUTPATIENT)
Dept: HOSPITAL 74 - JER | Age: 46
Setting detail: OBSERVATION
LOS: 2 days | Discharge: HOME | End: 2021-09-19
Payer: COMMERCIAL

## 2021-09-17 VITALS — BODY MASS INDEX: 42.8 KG/M2

## 2021-09-17 DIAGNOSIS — R00.2: Primary | ICD-10-CM

## 2021-09-17 DIAGNOSIS — I25.2: ICD-10-CM

## 2021-09-17 DIAGNOSIS — E78.5: ICD-10-CM

## 2021-09-17 DIAGNOSIS — I10: ICD-10-CM

## 2021-09-17 DIAGNOSIS — R07.89: ICD-10-CM

## 2021-09-17 DIAGNOSIS — Z87.891: ICD-10-CM

## 2021-09-17 DIAGNOSIS — Z29.9: ICD-10-CM

## 2021-09-17 DIAGNOSIS — Z95.5: ICD-10-CM

## 2021-09-17 DIAGNOSIS — I49.8: ICD-10-CM

## 2021-09-17 DIAGNOSIS — E66.01: ICD-10-CM

## 2021-09-17 LAB
BASOPHILS # BLD: 1.3 % (ref 0–2)
DEPRECATED RDW RBC AUTO: 13.4 % (ref 11.6–15.6)
EOSINOPHIL # BLD: 1.9 % (ref 0–4.5)
HCT VFR BLD CALC: 37.9 % (ref 32.4–45.2)
HGB BLD-MCNC: 12.9 GM/DL (ref 10.7–15.3)
LYMPHOCYTES # BLD: 31.3 % (ref 8–40)
MCH RBC QN AUTO: 28.6 PG (ref 25.7–33.7)
MCHC RBC AUTO-ENTMCNC: 34.1 G/DL (ref 32–36)
MCV RBC: 84 FL (ref 80–96)
MONOCYTES # BLD AUTO: 6.7 % (ref 3.8–10.2)
NEUTROPHILS # BLD: 58.8 % (ref 42.8–82.8)
PLATELET # BLD AUTO: 231 10^3/UL (ref 134–434)
PMV BLD: 8.3 FL (ref 7.5–11.1)
RBC # BLD AUTO: 4.51 M/MM3 (ref 3.6–5.2)
WBC # BLD AUTO: 12.4 K/MM3 (ref 4–10)

## 2021-09-17 PROCEDURE — G0378 HOSPITAL OBSERVATION PER HR: HCPCS

## 2021-09-17 PROCEDURE — U0003 INFECTIOUS AGENT DETECTION BY NUCLEIC ACID (DNA OR RNA); SEVERE ACUTE RESPIRATORY SYNDROME CORONAVIRUS 2 (SARS-COV-2) (CORONAVIRUS DISEASE [COVID-19]), AMPLIFIED PROBE TECHNIQUE, MAKING USE OF HIGH THROUGHPUT TECHNOLOGIES AS DESCRIBED BY CMS-2020-01-R: HCPCS

## 2021-09-17 PROCEDURE — C9803 HOPD COVID-19 SPEC COLLECT: HCPCS

## 2021-09-17 PROCEDURE — U0005 INFEC AGEN DETEC AMPLI PROBE: HCPCS

## 2021-09-18 LAB
ALBUMIN SERPL-MCNC: 3.9 G/DL (ref 3.4–5)
ALP SERPL-CCNC: 79 U/L (ref 45–117)
ALT SERPL-CCNC: 33 U/L (ref 13–61)
ANION GAP SERPL CALC-SCNC: 5 MMOL/L (ref 8–16)
ANION GAP SERPL CALC-SCNC: 8 MMOL/L (ref 8–16)
AST SERPL-CCNC: 21 U/L (ref 15–37)
BASOPHILS # BLD: 1.3 % (ref 0–2)
BILIRUB SERPL-MCNC: 0.5 MG/DL (ref 0.2–1)
BUN SERPL-MCNC: 17.1 MG/DL (ref 7–18)
BUN SERPL-MCNC: 18.8 MG/DL (ref 7–18)
CALCIUM SERPL-MCNC: 8.5 MG/DL (ref 8.5–10.1)
CALCIUM SERPL-MCNC: 8.8 MG/DL (ref 8.5–10.1)
CHLORIDE SERPL-SCNC: 105 MMOL/L (ref 98–107)
CHLORIDE SERPL-SCNC: 105 MMOL/L (ref 98–107)
CO2 SERPL-SCNC: 26 MMOL/L (ref 21–32)
CO2 SERPL-SCNC: 27 MMOL/L (ref 21–32)
CREAT SERPL-MCNC: 1 MG/DL (ref 0.55–1.3)
CREAT SERPL-MCNC: 1 MG/DL (ref 0.55–1.3)
DEPRECATED RDW RBC AUTO: 13.5 % (ref 11.6–15.6)
EOSINOPHIL # BLD: 1.6 % (ref 0–4.5)
GLUCOSE SERPL-MCNC: 127 MG/DL (ref 74–106)
GLUCOSE SERPL-MCNC: 139 MG/DL (ref 74–106)
HCT VFR BLD CALC: 38.5 % (ref 32.4–45.2)
HGB BLD-MCNC: 13.1 GM/DL (ref 10.7–15.3)
LYMPHOCYTES # BLD: 29.2 % (ref 8–40)
MAGNESIUM SERPL-MCNC: 2.1 MG/DL (ref 1.8–2.4)
MCH RBC QN AUTO: 28.4 PG (ref 25.7–33.7)
MCHC RBC AUTO-ENTMCNC: 34 G/DL (ref 32–36)
MCV RBC: 83.8 FL (ref 80–96)
MONOCYTES # BLD AUTO: 3.9 % (ref 3.8–10.2)
NEUTROPHILS # BLD: 64 % (ref 42.8–82.8)
PLATELET # BLD AUTO: 239 10^3/UL (ref 134–434)
PMV BLD: 8.3 FL (ref 7.5–11.1)
PROT SERPL-MCNC: 7.6 G/DL (ref 6.4–8.2)
RBC # BLD AUTO: 4.59 M/MM3 (ref 3.6–5.2)
SODIUM SERPL-SCNC: 137 MMOL/L (ref 136–145)
SODIUM SERPL-SCNC: 139 MMOL/L (ref 136–145)
WBC # BLD AUTO: 9.7 K/MM3 (ref 4–10)

## 2021-09-18 PROCEDURE — 3E023GC INTRODUCTION OF OTHER THERAPEUTIC SUBSTANCE INTO MUSCLE, PERCUTANEOUS APPROACH: ICD-10-PCS

## 2021-09-18 RX ADMIN — LISINOPRIL SCH MG: 20 TABLET ORAL at 13:00

## 2021-09-18 RX ADMIN — HEPARIN SODIUM SCH UNIT: 5000 INJECTION, SOLUTION INTRAVENOUS; SUBCUTANEOUS at 22:38

## 2021-09-18 RX ADMIN — ASPIRIN 81 MG SCH MG: 81 TABLET ORAL at 11:30

## 2021-09-18 RX ADMIN — SPIRONOLACTONE SCH MG: 25 TABLET, FILM COATED ORAL at 13:05

## 2021-09-18 RX ADMIN — HEPARIN SODIUM SCH: 5000 INJECTION, SOLUTION INTRAVENOUS; SUBCUTANEOUS at 12:27

## 2021-09-19 VITALS — HEART RATE: 71 BPM | TEMPERATURE: 98 F | SYSTOLIC BLOOD PRESSURE: 105 MMHG | DIASTOLIC BLOOD PRESSURE: 62 MMHG

## 2021-09-19 LAB
ANION GAP SERPL CALC-SCNC: 8 MMOL/L (ref 8–16)
BASOPHILS # BLD: 1.1 % (ref 0–2)
BUN SERPL-MCNC: 19.2 MG/DL (ref 7–18)
CALCIUM SERPL-MCNC: 8.6 MG/DL (ref 8.5–10.1)
CHLORIDE SERPL-SCNC: 107 MMOL/L (ref 98–107)
CHOLEST SERPL-MCNC: 116 MG/DL (ref 50–200)
CO2 SERPL-SCNC: 25 MMOL/L (ref 21–32)
CREAT SERPL-MCNC: 0.9 MG/DL (ref 0.55–1.3)
DEPRECATED RDW RBC AUTO: 13.5 % (ref 11.6–15.6)
EOSINOPHIL # BLD: 2.8 % (ref 0–4.5)
GLUCOSE SERPL-MCNC: 99 MG/DL (ref 74–106)
HCT VFR BLD CALC: 35 % (ref 32.4–45.2)
HDLC SERPL-MCNC: 37 MG/DL (ref 40–60)
HGB BLD-MCNC: 12 GM/DL (ref 10.7–15.3)
LDLC SERPL CALC-MCNC: 66 MG/DL (ref 5–100)
LYMPHOCYTES # BLD: 41.1 % (ref 8–40)
MCH RBC QN AUTO: 28.8 PG (ref 25.7–33.7)
MCHC RBC AUTO-ENTMCNC: 34.3 G/DL (ref 32–36)
MCV RBC: 84 FL (ref 80–96)
MONOCYTES # BLD AUTO: 8 % (ref 3.8–10.2)
NEUTROPHILS # BLD: 47 % (ref 42.8–82.8)
PLATELET # BLD AUTO: 227 10^3/UL (ref 134–434)
PMV BLD: 8.7 FL (ref 7.5–11.1)
RBC # BLD AUTO: 4.16 M/MM3 (ref 3.6–5.2)
SODIUM SERPL-SCNC: 139 MMOL/L (ref 136–145)
TRIGL SERPL-MCNC: 117 MG/DL (ref 0–150)
WBC # BLD AUTO: 8.4 K/MM3 (ref 4–10)

## 2021-09-19 RX ADMIN — SPIRONOLACTONE SCH MG: 25 TABLET, FILM COATED ORAL at 09:47

## 2021-09-19 RX ADMIN — LISINOPRIL SCH MG: 20 TABLET ORAL at 09:47

## 2021-09-19 RX ADMIN — HEPARIN SODIUM SCH UNIT: 5000 INJECTION, SOLUTION INTRAVENOUS; SUBCUTANEOUS at 09:47

## 2021-09-19 RX ADMIN — ASPIRIN 81 MG SCH MG: 81 TABLET ORAL at 09:47

## 2023-05-01 ENCOUNTER — HOSPITAL ENCOUNTER (OUTPATIENT)
Dept: HOSPITAL 74 - JERFT | Age: 48
Setting detail: OBSERVATION
LOS: 1 days | Discharge: HOME | End: 2023-05-02
Admitting: HOSPITALIST
Payer: COMMERCIAL

## 2023-05-01 VITALS — BODY MASS INDEX: 42 KG/M2

## 2023-05-01 DIAGNOSIS — G47.30: ICD-10-CM

## 2023-05-01 DIAGNOSIS — M79.602: ICD-10-CM

## 2023-05-01 DIAGNOSIS — I11.0: ICD-10-CM

## 2023-05-01 DIAGNOSIS — I25.10: ICD-10-CM

## 2023-05-01 DIAGNOSIS — R00.2: ICD-10-CM

## 2023-05-01 DIAGNOSIS — R73.9: ICD-10-CM

## 2023-05-01 DIAGNOSIS — I50.9: ICD-10-CM

## 2023-05-01 DIAGNOSIS — Z95.5: ICD-10-CM

## 2023-05-01 DIAGNOSIS — Z91.89: ICD-10-CM

## 2023-05-01 DIAGNOSIS — Z86.73: ICD-10-CM

## 2023-05-01 DIAGNOSIS — Z82.49: ICD-10-CM

## 2023-05-01 DIAGNOSIS — R07.89: Primary | ICD-10-CM

## 2023-05-01 DIAGNOSIS — R11.2: ICD-10-CM

## 2023-05-01 DIAGNOSIS — E66.9: ICD-10-CM

## 2023-05-01 DIAGNOSIS — E78.5: ICD-10-CM

## 2023-05-01 DIAGNOSIS — F41.0: ICD-10-CM

## 2023-05-01 LAB
ALBUMIN SERPL-MCNC: 3.8 G/DL (ref 3.4–5)
ALP SERPL-CCNC: 77 U/L (ref 45–117)
ALT SERPL-CCNC: 30 U/L (ref 13–61)
ANION GAP SERPL CALC-SCNC: 7 MMOL/L (ref 8–16)
AST SERPL-CCNC: 21 U/L (ref 15–37)
BASOPHILS # BLD: 0.5 % (ref 0–2)
BILIRUB SERPL-MCNC: 0.5 MG/DL (ref 0.2–1)
BNP SERPL-MCNC: 140.4 PG/ML (ref 5–125)
BUN SERPL-MCNC: 11.5 MG/DL (ref 7–18)
CALCIUM SERPL-MCNC: 8.9 MG/DL (ref 8.5–10.1)
CHLORIDE SERPL-SCNC: 102 MMOL/L (ref 98–107)
CO2 SERPL-SCNC: 26 MMOL/L (ref 21–32)
CREAT SERPL-MCNC: 0.8 MG/DL (ref 0.55–1.3)
DEPRECATED RDW RBC AUTO: 14.1 % (ref 11.6–15.6)
EOSINOPHIL # BLD: 0.4 % (ref 0–4.5)
GLUCOSE SERPL-MCNC: 98 MG/DL (ref 74–106)
HCT VFR BLD CALC: 38.2 % (ref 32.4–45.2)
HGB BLD-MCNC: 13 GM/DL (ref 10.7–15.3)
LIPASE SERPL-CCNC: 130 U/L (ref 73–393)
LYMPHOCYTES # BLD: 17.8 % (ref 8–40)
MCH RBC QN AUTO: 28.1 PG (ref 25.7–33.7)
MCHC RBC AUTO-ENTMCNC: 34.1 G/DL (ref 32–36)
MCV RBC: 82.3 FL (ref 80–96)
MONOCYTES # BLD AUTO: 10.7 % (ref 3.8–10.2)
NEUTROPHILS # BLD: 70.6 % (ref 42.8–82.8)
PLATELET # BLD AUTO: 228 10^3/UL (ref 134–434)
PMV BLD: 8.6 FL (ref 7.5–11.1)
POTASSIUM SERPLBLD-SCNC: 4.3 MMOL/L (ref 3.5–5.1)
PROT SERPL-MCNC: 8 G/DL (ref 6.4–8.2)
RBC # BLD AUTO: 4.64 M/MM3 (ref 3.6–5.2)
SODIUM SERPL-SCNC: 136 MMOL/L (ref 136–145)
WBC # BLD AUTO: 12.2 K/MM3 (ref 4–10)

## 2023-05-01 PROCEDURE — G0378 HOSPITAL OBSERVATION PER HR: HCPCS

## 2023-05-01 PROCEDURE — 3E033GC INTRODUCTION OF OTHER THERAPEUTIC SUBSTANCE INTO PERIPHERAL VEIN, PERCUTANEOUS APPROACH: ICD-10-PCS

## 2023-05-02 VITALS
HEART RATE: 88 BPM | DIASTOLIC BLOOD PRESSURE: 73 MMHG | TEMPERATURE: 98.6 F | SYSTOLIC BLOOD PRESSURE: 136 MMHG | RESPIRATION RATE: 18 BRPM

## 2023-05-02 LAB
ANION GAP SERPL CALC-SCNC: 7 MMOL/L (ref 8–16)
BASOPHILS # BLD: 0.7 % (ref 0–2)
BUN SERPL-MCNC: 13.9 MG/DL (ref 7–18)
CALCIUM SERPL-MCNC: 8.4 MG/DL (ref 8.5–10.1)
CHLORIDE SERPL-SCNC: 105 MMOL/L (ref 98–107)
CO2 SERPL-SCNC: 25 MMOL/L (ref 21–32)
CREAT SERPL-MCNC: 0.8 MG/DL (ref 0.55–1.3)
DEPRECATED RDW RBC AUTO: 13.9 % (ref 11.6–15.6)
EOSINOPHIL # BLD: 2.7 % (ref 0–4.5)
GLUCOSE SERPL-MCNC: 114 MG/DL (ref 74–106)
HCT VFR BLD CALC: 34.2 % (ref 32.4–45.2)
HGB BLD-MCNC: 11.9 GM/DL (ref 10.7–15.3)
LYMPHOCYTES # BLD: 29 % (ref 8–40)
MAGNESIUM SERPL-MCNC: 2.3 MG/DL (ref 1.8–2.4)
MCH RBC QN AUTO: 28.6 PG (ref 25.7–33.7)
MCHC RBC AUTO-ENTMCNC: 34.8 G/DL (ref 32–36)
MCV RBC: 82.2 FL (ref 80–96)
MONOCYTES # BLD AUTO: 12.8 % (ref 3.8–10.2)
NEUTROPHILS # BLD: 54.8 % (ref 42.8–82.8)
PHOSPHATE SERPL-MCNC: 3 MG/DL (ref 2.5–4.9)
PLATELET # BLD AUTO: 210 10^3/UL (ref 134–434)
PMV BLD: 8.4 FL (ref 7.5–11.1)
POTASSIUM SERPLBLD-SCNC: 4.1 MMOL/L (ref 3.5–5.1)
RBC # BLD AUTO: 4.16 M/MM3 (ref 3.6–5.2)
SODIUM SERPL-SCNC: 137 MMOL/L (ref 136–145)
WBC # BLD AUTO: 9.9 K/MM3 (ref 4–10)

## 2023-06-13 ENCOUNTER — HOSPITAL ENCOUNTER (OUTPATIENT)
Dept: HOSPITAL 74 - JASU-SURG | Age: 48
Discharge: HOME | End: 2023-06-13
Attending: STUDENT IN AN ORGANIZED HEALTH CARE EDUCATION/TRAINING PROGRAM
Payer: COMMERCIAL

## 2023-06-13 VITALS — HEART RATE: 50 BPM | RESPIRATION RATE: 16 BRPM | SYSTOLIC BLOOD PRESSURE: 102 MMHG | DIASTOLIC BLOOD PRESSURE: 68 MMHG

## 2023-06-13 VITALS — BODY MASS INDEX: 39.7 KG/M2

## 2023-06-13 VITALS — TEMPERATURE: 97.3 F

## 2023-06-13 DIAGNOSIS — N20.0: Primary | ICD-10-CM

## 2023-06-13 PROCEDURE — 0TF4XZZ FRAGMENTATION IN LEFT KIDNEY PELVIS, EXTERNAL APPROACH: ICD-10-PCS | Performed by: STUDENT IN AN ORGANIZED HEALTH CARE EDUCATION/TRAINING PROGRAM

## 2025-03-26 ENCOUNTER — HOSPITAL ENCOUNTER (OUTPATIENT)
Dept: HOSPITAL 74 - JRADIR | Age: 50
Discharge: HOME | End: 2025-03-26
Attending: INTERNAL MEDICINE
Payer: COMMERCIAL

## 2025-03-26 DIAGNOSIS — E04.2: Primary | ICD-10-CM

## 2025-03-26 PROCEDURE — 0GBG3ZX EXCISION OF LEFT THYROID GLAND LOBE, PERCUTANEOUS APPROACH, DIAGNOSTIC: ICD-10-PCS

## 2025-03-26 PROCEDURE — 0GBH3ZX EXCISION OF RIGHT THYROID GLAND LOBE, PERCUTANEOUS APPROACH, DIAGNOSTIC: ICD-10-PCS
